# Patient Record
Sex: MALE | Employment: OTHER | URBAN - METROPOLITAN AREA
[De-identification: names, ages, dates, MRNs, and addresses within clinical notes are randomized per-mention and may not be internally consistent; named-entity substitution may affect disease eponyms.]

---

## 2017-12-07 ENCOUNTER — OFFICE VISIT (OUTPATIENT)
Dept: OPHTHALMOLOGY | Facility: CLINIC | Age: 57
End: 2017-12-07
Attending: OPHTHALMOLOGY
Payer: COMMERCIAL

## 2017-12-07 DIAGNOSIS — H53.10 SUBJECTIVE VISUAL DISTURBANCE: Primary | ICD-10-CM

## 2017-12-07 DIAGNOSIS — H53.10 SUBJECTIVE VISUAL DISTURBANCE: ICD-10-CM

## 2017-12-07 DIAGNOSIS — H50.22 HYPERTROPIA OF LEFT EYE: Primary | ICD-10-CM

## 2017-12-07 PROCEDURE — 99215 OFFICE O/P EST HI 40 MIN: CPT | Mod: 25,ZF

## 2017-12-07 PROCEDURE — 92060 SENSORIMOTOR EXAMINATION: CPT | Mod: ZF | Performed by: OPHTHALMOLOGY

## 2017-12-07 ASSESSMENT — TONOMETRY
OS_IOP_MMHG: 12
IOP_METHOD: ICARE
OD_IOP_MMHG: 11

## 2017-12-07 ASSESSMENT — VISUAL ACUITY
METHOD: SNELLEN - LINEAR
OD_CC: 20/25
CORRECTION_TYPE: GLASSES
OS_CC: 20/30

## 2017-12-07 ASSESSMENT — REFRACTION_WEARINGRX
OD_VBASE: UP
OS_SPHERE: -3.75
OD_ADD: +2.25
OD_SPHERE: -3.75
OS_ADD: +2.25
OS_AXIS: 038
OS_CYLINDER: +0.75
OS_SPHERE: -4.75
OD_HPRISM: 3 BI
OD_CYLINDER: +1.00
OD_SPHERE: -5.00
OD_AXIS: 133
OD_ADD: +2.25
OD_CYLINDER: +0.75
OS_CYLINDER: +0.75
OS_AXIS: 044
OD_AXIS: 138
OS_VBASE: DOWN
OS_ADD: +2.25

## 2017-12-07 ASSESSMENT — EXTERNAL EXAM - LEFT EYE: OS_EXAM: MRD 3

## 2017-12-07 ASSESSMENT — SLIT LAMP EXAM - LIDS
COMMENTS: NORMAL
COMMENTS: NORMAL

## 2017-12-07 ASSESSMENT — CONF VISUAL FIELD
OD_NORMAL: 1
OS_NORMAL: 1

## 2017-12-07 ASSESSMENT — EXTERNAL EXAM - RIGHT EYE: OD_EXAM: MRD 5

## 2017-12-07 ASSESSMENT — CUP TO DISC RATIO
OS_RATIO: 0.4
OD_RATIO: 0.4

## 2017-12-07 NOTE — NURSING NOTE
Chief Complaints and History of Present Illnesses   Patient presents with     Neurologic Problem     diplopia     HPI    Symptoms:              Comments:  Dane is a 57 year old male presenting with a history of:    1. Diplopia  Sudden onset diplopia after Glaucoma surgery (Ahmed procedure) June 26/2017. Done by Dr. Barreto.   Constant oblique diplopia, mostly vertical   Incorporated prisms. 14.5 pd OU.   No change in misalignment since onset in June.     Benedicto Granadomaayaka CO 1:44 PM December 7, 2017

## 2017-12-07 NOTE — MR AVS SNAPSHOT
After Visit Summary   12/7/2017    Dane Edward    MRN: [id number]           Patient Information     Date Of Birth          1960        Visit Information        Provider Department      12/7/2017 1:30 PM Dm Catherine MD [facility]        Today's Diagnoses     Hypertropia of left eye    -  1    Subjective visual disturbance           Follow-ups after your visit        Follow-up notes from your care team     Return for adult strabismus - kashif.      Your next 10 appointments already scheduled     Feb 07, 2018  1:30 PM CST   New Adult Strabismus with Shelton Baez MD   Eye Clinic ([facility])    [address]  [address]  [address]  [facility] [address] [phone]              Who to contact     Please call your clinic at [phone] to:    Ask questions about your health    Make or cancel appointments    Discuss your medicines    Learn about your test results    Speak to your doctor   If you have compliments or concerns about an experience at your clinic, or if you wish to file a complaint, please contact [facility] Patient Relations at [phone] or email us at [email or web address]         Additional Information About Your Visit        [employer] Information     [employer] gives you secure access to your electronic health record. If you see a primary care provider, you can also send messages to your care team and make appointments. If you have questions, please call your primary care clinic.  If you do not have a primary care provider, please call [phone] and they will assist you.      Ker[employer] is an electronic gateway that provides easy, online access to your medical records. With Ker[employer], you can request a clinic appointment, read your test results, renew a prescription or communicate with your care team.     To access your existing account, please contact your [facility]  Physicians Clinic or call [phone] for assistance.        Care EveryWhere ID     This is your Care EveryWhere ID. This could be used by other organizations to access your [facility] medical records  [id number]         Blood Pressure from Last 3 Encounters:   No data found for BP    Weight from Last 3 Encounters:   No data found for Wt              We Performed the Following     DILATED FUNDUS EXAM     IOP Measurement     Sensorimotor        Primary Care Provider Office Phone # Fax #    Kj Gonzalez [phone]       [facility] [address]  [facility] [address]        Equal Access to Services     [facility]: Hadii aad ku hadasho Soomaali, waaxda luqadaha, qaybta kaalmada adeegyada, josse colin . So [facility] [phone].    ATENCIÓN: Si habla español, tiene a jaramillo disposición servicios gratuitos de asistencia lingüística. Ll[facility] Healthcare [phone].    We comply with applicable federal civil rights laws and [place] laws. We do not discriminate on the basis of race, color, national origin, age, disability, sex, sexual orientation, or gender identity.            Thank you!     Thank you for choosing EYE CLINIC  for your care. Our goal is always to provide you with excellent care. Hearing back from our patients is one way we can continue to improve our services. Please take a few minutes to complete the written survey that you may receive in the mail after your visit with us. Thank you!             Your Updated Medication List - Protect others around you: Learn how to safely use, store and throw away your medicines at www.disposemymeds.org.      Notice  As of 12/7/2017  2:28 PM    You have not been prescribed any medications.

## 2017-12-07 NOTE — PROGRESS NOTES
Assessment & Plan     Dane Edward is a 57 year old male with the following diagnoses:   1. Hypertropia of left eye    2. Subjective visual disturbance       He had intraocular pressure around 50 both eyes secondary to topical corticosteroids from iritis.  He underwent glaucoma surgery on 6/26/17 on the RIGHT eye and this was fine. He had an ahmed 2 weeks later on the LEFT eye.  He had a numbing shot for both of them.  Postop day 1 for the LEFT eye, his eye was quite red and the eyelids were swollen.  The eye was not proptotic.  No CT was done.  He did not have a canthotomy or cantholysis.  Ever since POD1 his LEFT eye has been very hypertropic.  He was placed in prism glasses about a month ago.  The current prism helps his double vision.      He has a fairly comitant left hypertropia and is doing extremely well in prisms.  It is not clear whether this is myotoxicity from the retrobulbar or due to the Ahmed itself.  Recommend observation x 2 months and then see Shelton Baez MD.  If stable at that time then consider strabismus surgery.               Attending Physician Attestation:  Complete documentation of historical and exam elements from today's encounter can be found in the full encounter summary report (not reduplicated in this progress note).  I personally obtained the chief complaint(s) and history of present illness.  I confirmed and edited as necessary the review of systems, past medical/surgical history, family history, social history, and examination findings as documented by others; and I examined the patient myself.  I personally reviewed the relevant tests, images, and reports as documented above.  I formulated and edited as necessary the assessment and plan and discussed the findings and management plan with the patient and family. - Dm Catherine MD

## 2018-02-04 DIAGNOSIS — H53.2 DOUBLE VISION: Primary | ICD-10-CM

## 2018-02-07 ENCOUNTER — OFFICE VISIT (OUTPATIENT)
Dept: OPHTHALMOLOGY | Facility: CLINIC | Age: 58
End: 2018-02-07
Attending: OPHTHALMOLOGY
Payer: COMMERCIAL

## 2018-02-07 DIAGNOSIS — H53.2 DOUBLE VISION: ICD-10-CM

## 2018-02-07 DIAGNOSIS — H50.22 HYPERTROPIA OF LEFT EYE: Primary | ICD-10-CM

## 2018-02-07 PROCEDURE — 92060 SENSORIMOTOR EXAMINATION: CPT | Mod: ZF | Performed by: OPHTHALMOLOGY

## 2018-02-07 ASSESSMENT — VISUAL ACUITY
OD_CC: 20/25
OD_CC: 20/20
OS_CC: 20/25
CORRECTION_TYPE: GLASSES
OD_CC+: -2
OS_CC: 20/30
OS_CC+: -2
METHOD: SNELLEN - LINEAR

## 2018-02-07 ASSESSMENT — TONOMETRY
OS_IOP_MMHG: 9
OD_IOP_MMHG: 7
IOP_METHOD: ICARE

## 2018-02-07 ASSESSMENT — REFRACTION_WEARINGRX
OD_ADD: +2.25
OS_ADD: +2.25
OS_AXIS: 044
OD_CYLINDER: +1.00
OD_HPRISM: 3 BI
OD_ADD: +2.25
OS_SPHERE: -4.75
OS_ADD: +2.25
OS_CYLINDER: +0.75
OD_SPHERE: -5.00
OD_VBASE: UP
OS_CYLINDER: +0.75
OD_CYLINDER: +0.75
OS_VBASE: DOWN
OD_SPHERE: -3.75
OD_AXIS: 138
OS_SPHERE: -3.75
OS_AXIS: 038
OD_AXIS: 133

## 2018-02-07 ASSESSMENT — SLIT LAMP EXAM - LIDS
COMMENTS: NORMAL
COMMENTS: NORMAL

## 2018-02-07 ASSESSMENT — CONF VISUAL FIELD
OD_NORMAL: 1
OS_NORMAL: 1

## 2018-02-07 ASSESSMENT — EXTERNAL EXAM - LEFT EYE: OS_EXAM: MRD 3

## 2018-02-07 ASSESSMENT — CUP TO DISC RATIO
OD_RATIO: 0.4
OS_RATIO: 0.4

## 2018-02-07 ASSESSMENT — EXTERNAL EXAM - RIGHT EYE: OD_EXAM: MRD 5

## 2018-02-07 NOTE — MR AVS SNAPSHOT
After Visit Summary   2/7/2018    Dane Edward    MRN: [id number]           Patient Information     Date Of Birth          1960        Visit Information        Provider Department      2/7/2018 1:30 PM Shelton Baez MD [facility] Clinic        Today's Diagnoses     Hypertropia of left eye    -  1    Double vision           Follow-ups after your visit        Your next 10 appointments already scheduled     Feb 19, 2018   Procedure with Shelton Baez MD   [facility] ([email or web address] and [facility] Center)    [address] [place] [address]  5th [facility] [address] [phone]           Located in the Clinics and Surgery Center at [address] [place] [address].   parking is very convenient and highly recommended.  is a $6 flat rate fee.  Both  and self parkers should enter the main arrival plaza from [facility]; parking attendants will direct you based on your parking preference.            Feb 28, 2018  2:00 PM CST   Post-Op with Shelton Baez MD   Eye Clinic ([facility])    [address]  [facility] [id number] [phone]              Who to contact     Please call your clinic at [phone] to:    Ask questions about your health    Make or cancel appointments    Discuss your medicines    Learn about your test results    Speak to your doctor            Additional Information About Your Visit        MyChart Information     [employer] gives you secure access to your electronic health record. If you see a primary care provider, you can also send messages to your care team and make appointments. If you have questions, please call your primary care clinic.  If you do not have a primary care provider, please call [phone] and they will assist you.      [employer] is an electronic gateway that provides easy, online access to your medical records. With  [employer], you can request a clinic appointment, read your test results, renew a prescription or communicate with your care team.     To access your existing account, please contact your [facility] Clinic or call [phone] for assistance.        Care EveryWhere ID     This is your Care EveryWhere ID. This could be used by other organizations to access your [facility] medical records  [id number]         Blood Pressure from Last 3 Encounters:   No data found for BP    Weight from Last 3 Encounters:   No data found for Wt              We Performed the Following     IOP Measurement     Estela-Operative Worksheet (Peds)     Sensorimotor        Primary Care Provider Office Phone # Fax #    Kj Gonzalez [phone]       [facility] [address] [facility] [address]  [facility] [address]        Equal Access to Services     [facility] : Hadii bala rojaso Soaliya, waaxda luqadaha, qaybta kaalmada adeegyada, josse colin . So [facility] [phone].    ATENCIÓN: Si habla español, tiene a jaramillo disposición servicios gratuitos de asistencia lingüística. Llame al [phone].    We comply with applicable federal civil rights laws and [place] laws. We do not discriminate on the basis of race, color, national origin, age, disability, sex, sexual orientation, or gender identity.            Thank you!     Thank you for choosing EYE CLINIC  for your care. Our goal is always to provide you with excellent care. Hearing back from our patients is one way we can continue to improve our services. Please take a few minutes to complete the written survey that you may receive in the mail after your visit with us. Thank you!             Your Updated Medication List - Protect others around you: Learn how to safely use, store and throw away your medicines at www.disposemymeds.org.      Notice  As of 2/7/2018 11:59 PM    You have not been prescribed any medications.

## 2018-02-07 NOTE — PROGRESS NOTES
1. 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 Binocular diplopia following 2nd surgery of sequential Ahmed valve surgery ( in the right eye first then in the left eye on July 10, 2017)  3. Status post uncomplicated cataract extraction in both eyes September 2017    Sequential glaucoma surgery by Dr. Barreto  June 26- right eye.- Ahmed valve  No diplopia    July 10-left eye. Immediate diplopia- Ahmed valve.  Patient reports he had massive periocular swelling at the time of the surgery.  Underwent retrobulbar block for procedure.  Cataract extraction in the right eye September 2017  Cataract extraction in the left eye September 2017    Patient highly interested in strabismus surgery to allow him single binocular vision with or without prism glasses.  Mechanism of strabismus remains unclear at this time- restrictive left hypertropia from Ahmed valve on the left eye restricting left superior rectus versus paresis of left inferior rectus secondary to retrobulbar block.  Forced ductions is the best means to distinguish these two.  This is best performed under general anesthesia.    We discussed in detail the risks, benefits, and alternatives of eye muscle correction surgery including the very rare risk of death or serious morbidity from a general anesthesia complication and the rare risk of severe vision loss in the operative eye(s) secondary to retinal detachment or endophthalmitis.  We discussed more likely sub-optimal outcomes including the unanticipated need for additional strabismus surgery.  Finally the patient was aware that prisms glasses may be required to optimize single binocular vision following surgery.    After a thorough discussion of these risks, the patient decided to proceed with strabismus surgery.  The surgical plan is as follows:     1. Forced ductions testing in the left eye    2. If no restriction of left superior rectus then proceed with left inferior rectus  resection on adjustable suture    3. Possible right inferior rectus recession on adjustable suture    4. If left superior rectus is restricted then will abort strabismus surgery as we will need to revise Ahmed valve before or simultaneous with strabismus surgery.    Follow-up 1 week after strabismus surgery.         Complete documentation of historical and exam elements from today's encounter can be found in the full encounter summary report (not reduplicated in this progress note).  I personally obtained the chief complaint(s) and history of present illness.  I confirmed and edited as necessary the review of systems, past medical/surgical history, family history, social history, and examination findings as documented by others; and I examined the patient myself.  I personally reviewed the relevant tests, images, and reports as documented above.  I formulated and edited as necessary the assessment and plan and discussed the findings and management plan with the patient and family   Shelton Baez MD

## 2018-02-07 NOTE — NURSING NOTE
Chief Complaints and History of Present Illnesses   Patient presents with     Neurologic Problem     diplopia f/u      HPI    Symptoms:              Comments:   Dane Edward is a 57 year old male with the following diagnoses:   1. Hypertropia of left eye   2. Subjective visual disturbance     Referred by Dr. Catherine. Interested in strabismus surgery.   Has incorporated prisms.   Date tentatively set for the 19 th of February.     Benedicto Granadomaayaka CO 1:23 PM February 7, 2018

## 2018-02-07 NOTE — LETTER
2018    RE: Dane Edward  : 1960  MRN: [id number]    Dear Dr. Barreto:    Thank you for referring your patient, Dane Edward, to my adult strabismus clinic recently.  After a thorough history and examination, I came to the following conclusions:     1. 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 Binocular diplopia following 2nd surgery of sequential Ahmed valve surgery ( in the right eye first then in the left eye on July 10, 2017)  3. Status post uncomplicated cataract extraction in both eyes 2017    Sequential glaucoma surgery by Dr. Barreto - right eye.- Ahmed valve  No diplopia,    July 10-left eye. Immediate diplopia- Ahmed valve.  Patient reports he had massive periocular swelling at the time of the surgery.  Underwent retrobulbar block for procedure.  Cataract extraction in the right eye 2017  Cataract extraction in the left eye 2017    Patient highly interested in strabismus surgery to allow him single binocular vision with or without prism glasses.  Mechanism of strabismus remains unclear at this time- restrictive left hypertropia from Ahmed valve on the left eye restricting left superior rectus versus paresis of left inferior rectus secondary to retrobulbar block.  Forced ductions is the best means to distinguish these two.  This is best performed under general anesthesia.    We discussed in detail the risks, benefits, and alternatives of eye muscle correction surgery including the very rare risk of death or serious morbidity from a general anesthesia complication and the rare risk of severe vision loss in the operative eye(s) secondary to retinal detachment or endophthalmitis.  We discussed more likely sub-optimal outcomes including the unanticipated need for additional strabismus surgery.  Finally the patient was aware that prisms glasses may be required to optimize single binocular vision following  surgery.    After a thorough discussion of these risks, the patient decided to proceed with strabismus surgery.  The surgical plan is as follows:  1. Forced ductions testing in the left eye   2. If no restriction of left superior rectus then proceed with left inferior rectus resection on adjustable suture   3. Possible right inferior rectus recession on adjustable suture   4. If left superior rectus is restricted then will abort strabismus surgery as we will need to revise Ahmed valve before or simultaneous with strabismus surgery.    Follow-up 1 week after strabismus surgery.      Again, thank you for trusting me with the care of your patient.  For further exam details, please feel free to contact our office for additional records.  If you wish to contact me regarding this patient please email me at [email or web address] or give my clinic a call to arrange a phone conversation.    Sincerely,    Shelton Baez MD  , Neuro-Ophthalmology and Adult Strabismus  Department of Ophthalmology and Visual Neurosciences  [facility]    cc: Kj Bustos, DO Dm Catherine MD    DX: hypertropia, Ahmed valve associated strabismus

## 2018-02-14 RX ORDER — AMLODIPINE BESYLATE 5 MG/1
TABLET ORAL
COMMUNITY
Start: 2017-09-28

## 2018-02-14 RX ORDER — MULTIPLE VITAMINS W/ MINERALS TAB 9MG-400MCG
1 TAB ORAL DAILY
COMMUNITY

## 2018-02-16 ENCOUNTER — ANESTHESIA EVENT (OUTPATIENT)
Dept: SURGERY | Facility: AMBULATORY SURGERY CENTER | Age: 58
End: 2018-02-16

## 2018-02-19 ENCOUNTER — SURGERY (OUTPATIENT)
Age: 58
End: 2018-02-19

## 2018-02-19 ENCOUNTER — HOSPITAL ENCOUNTER (OUTPATIENT)
Facility: AMBULATORY SURGERY CENTER | Age: 58
End: 2018-02-19
Attending: OPHTHALMOLOGY
Payer: COMMERCIAL

## 2018-02-19 ENCOUNTER — ANESTHESIA (OUTPATIENT)
Dept: SURGERY | Facility: AMBULATORY SURGERY CENTER | Age: 58
End: 2018-02-19

## 2018-02-19 VITALS
OXYGEN SATURATION: 95 % | HEART RATE: 81 BPM | RESPIRATION RATE: 15 BRPM | TEMPERATURE: 98.4 F | DIASTOLIC BLOOD PRESSURE: 93 MMHG | BODY MASS INDEX: 22.22 KG/M2 | WEIGHT: 150 LBS | HEIGHT: 69 IN | SYSTOLIC BLOOD PRESSURE: 136 MMHG

## 2018-02-19 DIAGNOSIS — Z98.890 POSTOPERATIVE EYE STATE: Primary | ICD-10-CM

## 2018-02-19 RX ORDER — FENTANYL CITRATE 50 UG/ML
INJECTION, SOLUTION INTRAMUSCULAR; INTRAVENOUS PRN
Status: DISCONTINUED | OUTPATIENT
Start: 2018-02-19 | End: 2018-02-19

## 2018-02-19 RX ORDER — LIDOCAINE 40 MG/G
CREAM TOPICAL
Status: DISCONTINUED | OUTPATIENT
Start: 2018-02-19 | End: 2018-02-20 | Stop reason: HOSPADM

## 2018-02-19 RX ORDER — TETRACAINE HYDROCHLORIDE 5 MG/ML
1-2 SOLUTION OPHTHALMIC
Status: DISCONTINUED | OUTPATIENT
Start: 2018-02-19 | End: 2018-02-20 | Stop reason: HOSPADM

## 2018-02-19 RX ORDER — LIDOCAINE HYDROCHLORIDE 20 MG/ML
INJECTION, SOLUTION INFILTRATION; PERINEURAL PRN
Status: DISCONTINUED | OUTPATIENT
Start: 2018-02-19 | End: 2018-02-19

## 2018-02-19 RX ORDER — ONDANSETRON 2 MG/ML
4 INJECTION INTRAMUSCULAR; INTRAVENOUS EVERY 30 MIN PRN
Status: DISCONTINUED | OUTPATIENT
Start: 2018-02-19 | End: 2018-02-20 | Stop reason: HOSPADM

## 2018-02-19 RX ORDER — ONDANSETRON 4 MG/1
4 TABLET, ORALLY DISINTEGRATING ORAL EVERY 30 MIN PRN
Status: DISCONTINUED | OUTPATIENT
Start: 2018-02-19 | End: 2018-02-20 | Stop reason: HOSPADM

## 2018-02-19 RX ORDER — GLYCOPYRROLATE 0.2 MG/ML
INJECTION, SOLUTION INTRAMUSCULAR; INTRAVENOUS PRN
Status: DISCONTINUED | OUTPATIENT
Start: 2018-02-19 | End: 2018-02-19

## 2018-02-19 RX ORDER — ONDANSETRON 2 MG/ML
INJECTION INTRAMUSCULAR; INTRAVENOUS PRN
Status: DISCONTINUED | OUTPATIENT
Start: 2018-02-19 | End: 2018-02-19

## 2018-02-19 RX ORDER — NALOXONE HYDROCHLORIDE 0.4 MG/ML
.1-.4 INJECTION, SOLUTION INTRAMUSCULAR; INTRAVENOUS; SUBCUTANEOUS
Status: DISCONTINUED | OUTPATIENT
Start: 2018-02-19 | End: 2018-02-20 | Stop reason: HOSPADM

## 2018-02-19 RX ORDER — OXYMETAZOLINE HYDROCHLORIDE 0.05 G/100ML
SPRAY NASAL PRN
Status: DISCONTINUED | OUTPATIENT
Start: 2018-02-19 | End: 2018-02-19 | Stop reason: HOSPADM

## 2018-02-19 RX ORDER — FENTANYL CITRATE 50 UG/ML
25-50 INJECTION, SOLUTION INTRAMUSCULAR; INTRAVENOUS EVERY 5 MIN PRN
Status: DISCONTINUED | OUTPATIENT
Start: 2018-02-19 | End: 2018-02-20 | Stop reason: HOSPADM

## 2018-02-19 RX ORDER — PROPOFOL 10 MG/ML
INJECTION, EMULSION INTRAVENOUS PRN
Status: DISCONTINUED | OUTPATIENT
Start: 2018-02-19 | End: 2018-02-19

## 2018-02-19 RX ORDER — SODIUM CHLORIDE, SODIUM LACTATE, POTASSIUM CHLORIDE, CALCIUM CHLORIDE 600; 310; 30; 20 MG/100ML; MG/100ML; MG/100ML; MG/100ML
INJECTION, SOLUTION INTRAVENOUS CONTINUOUS
Status: DISCONTINUED | OUTPATIENT
Start: 2018-02-19 | End: 2018-02-20 | Stop reason: HOSPADM

## 2018-02-19 RX ORDER — DEXAMETHASONE SODIUM PHOSPHATE 4 MG/ML
INJECTION, SOLUTION INTRA-ARTICULAR; INTRALESIONAL; INTRAMUSCULAR; INTRAVENOUS; SOFT TISSUE PRN
Status: DISCONTINUED | OUTPATIENT
Start: 2018-02-19 | End: 2018-02-19

## 2018-02-19 RX ORDER — ACETAMINOPHEN 325 MG/1
975 TABLET ORAL ONCE
Status: COMPLETED | OUTPATIENT
Start: 2018-02-19 | End: 2018-02-19

## 2018-02-19 RX ORDER — PREDNISOLONE ACETATE 10 MG/ML
1 SUSPENSION/ DROPS OPHTHALMIC 4 TIMES DAILY
Qty: 1 BOTTLE | Refills: 0 | Status: SHIPPED | OUTPATIENT
Start: 2018-02-19

## 2018-02-19 RX ORDER — BALANCED SALT SOLUTION 6.4; .75; .48; .3; 3.9; 1.7 MG/ML; MG/ML; MG/ML; MG/ML; MG/ML; MG/ML
SOLUTION OPHTHALMIC PRN
Status: DISCONTINUED | OUTPATIENT
Start: 2018-02-19 | End: 2018-02-19 | Stop reason: HOSPADM

## 2018-02-19 RX ORDER — PROPOFOL 10 MG/ML
INJECTION, EMULSION INTRAVENOUS CONTINUOUS PRN
Status: DISCONTINUED | OUTPATIENT
Start: 2018-02-19 | End: 2018-02-19

## 2018-02-19 RX ADMIN — ONDANSETRON 4 MG: 2 INJECTION INTRAMUSCULAR; INTRAVENOUS at 07:40

## 2018-02-19 RX ADMIN — ACETAMINOPHEN 975 MG: 325 TABLET ORAL at 06:35

## 2018-02-19 RX ADMIN — PROPOFOL 50 MG: 10 INJECTION, EMULSION INTRAVENOUS at 07:34

## 2018-02-19 RX ADMIN — BALANCED SALT SOLUTION 1 APPLICATOR: 6.4; .75; .48; .3; 3.9; 1.7 SOLUTION OPHTHALMIC at 07:49

## 2018-02-19 RX ADMIN — LIDOCAINE HYDROCHLORIDE 100 MG: 20 INJECTION, SOLUTION INFILTRATION; PERINEURAL at 07:32

## 2018-02-19 RX ADMIN — DEXAMETHASONE SODIUM PHOSPHATE 4 MG: 4 INJECTION, SOLUTION INTRA-ARTICULAR; INTRALESIONAL; INTRAMUSCULAR; INTRAVENOUS; SOFT TISSUE at 07:40

## 2018-02-19 RX ADMIN — ONDANSETRON 4 MG: 2 INJECTION INTRAMUSCULAR; INTRAVENOUS at 08:10

## 2018-02-19 RX ADMIN — GLYCOPYRROLATE 0.2 MG: 0.2 INJECTION, SOLUTION INTRAMUSCULAR; INTRAVENOUS at 07:29

## 2018-02-19 RX ADMIN — PROPOFOL 50 MG: 10 INJECTION, EMULSION INTRAVENOUS at 07:33

## 2018-02-19 RX ADMIN — FENTANYL CITRATE 50 MCG: 50 INJECTION, SOLUTION INTRAMUSCULAR; INTRAVENOUS at 07:39

## 2018-02-19 RX ADMIN — SODIUM CHLORIDE, SODIUM LACTATE, POTASSIUM CHLORIDE, CALCIUM CHLORIDE: 600; 310; 30; 20 INJECTION, SOLUTION INTRAVENOUS at 06:36

## 2018-02-19 RX ADMIN — TETRACAINE HYDROCHLORIDE 1 DROP: 5 SOLUTION OPHTHALMIC at 08:57

## 2018-02-19 RX ADMIN — PROPOFOL 200 MG: 10 INJECTION, EMULSION INTRAVENOUS at 07:32

## 2018-02-19 RX ADMIN — PROPOFOL 200 MCG/KG/MIN: 10 INJECTION, EMULSION INTRAVENOUS at 07:33

## 2018-02-19 RX ADMIN — FENTANYL CITRATE 50 MCG: 50 INJECTION, SOLUTION INTRAMUSCULAR; INTRAVENOUS at 07:32

## 2018-02-19 RX ADMIN — OXYMETAZOLINE HYDROCHLORIDE 0.5 ML: 0.05 SPRAY NASAL at 07:40

## 2018-02-19 NOTE — ANESTHESIA PREPROCEDURE EVALUATION
Anesthesia Evaluation     .             ROS/MED HX    ENT/Pulmonary:  - neg pulmonary ROS     Neurologic:  - neg neurologic ROS     Cardiovascular:     (+) hypertension----. : . . . :. . Previous cardiac testing date:results:date: results:ECG reviewed date:2/14/18 results:EKG shows borderline ST seg elevation, likely early repolarization. SB at 59 BPM date: results:          METS/Exercise Tolerance:  >4 METS   Hematologic:         Musculoskeletal:  - neg musculoskeletal ROS       GI/Hepatic:  - neg GI/hepatic ROS       Renal/Genitourinary:  - ROS Renal section negative       Endo:  - neg endo ROS       Psychiatric:  - neg psychiatric ROS       Infectious Disease:  - neg infectious disease ROS       Malignancy:      - no malignancy   Other:    - neg other ROS                 Physical Exam  Normal systems: dental    Airway   Mallampati: II  TM distance: >3 FB  Neck ROM: full    Dental     Cardiovascular   Rhythm and rate: regular and normal      Pulmonary    breath sounds clear to auscultation                    Anesthesia Plan      History & Physical Review  History and physical reviewed and following examination; no interval change.    ASA Status:  2 .    NPO Status:  > 8 hours    Plan for General with Propofol induction. Maintenance will be TIVA.    PONV prophylaxis:  Ondansetron (or other 5HT-3) and Dexamethasone or Solumedrol       Postoperative Care  Postoperative pain management:  IV analgesics and Oral pain medications.      Consents  Anesthetic plan, risks, benefits and alternatives discussed with:  Patient..                          .

## 2018-02-19 NOTE — OP NOTE
ATTENDING SURGEON:  Shelton Baez MD    DATE OF PROCEDURE:  February 19, 2018    FIRST SURGICAL ASSISTANT:  ZITA TIWARI MD     ANESTHESIA:  General anesthesia    PREOPERATIVE DIAGNOSIS (ES):  1. 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 Forced ductions testing in both eyes   2. Left inferior rectus resection 5.5 mm   3. Right inferior rectus recession 1 mm on adjustable suture- recessed an additional approximate 4 mm during adjustment postoperatively.    INDICATIONS FOR PROCEDURE:    The patient is a pleasant 57 year old male with a history of chronic HLA-B27 associated uveitis.  He developed bilateral glaucoma requiring sequential (right then left) Ahmed tube shunt placement.  The left eye was performed in July 2017 by Dr. Barreto.  A retrobulbar injection was performed at the time of surgery.   Immediately afterwards he had an obvious left hypertropia with binocular diplopia.  The patient was observed for spontaneous resolution but this did not occur.  He was highly motivated for strabismus surgery to reduce dependency on prism glasses and allow single binocular vision with or without prism glasses.      I warned the patient about the risks, benefits, and alternatives of eye muscle surgery including a relatively small risk for severe infection, retinal detachment, and permanent vision loss of the eye.  I also discussed the possibility of postoperative double vision.  I discussed the possibility that due to postoperative double vision or a postoperative recurrent strabismus, the patient may require additional strabismus procedures in the future.  Understanding these risks, the patient decided to proceed with strabismus surgery.  The patient was aware that given the unclear mechanism of hypertropia (anesthetic injection complication causing left inferior rectus paresis versus scarring and restriction of the left  "superior rectus from the Ahmed implant) the surgery would start with forced ductions and may be aborted if there is an indication of left superior rectus restriction.  Repair of restrictive strabismus would require a combined surgical plan with glaucoma and may require removal of the Ahmed implant.     DESCRIPTION OF PROCEDURE:    After the risks, benefits, and alternatives of eye muscle surgery were discussed with the patient and the patient's questions were answered both in clinic and on the day of surgery, written informed consent was obtained and witnessed in the preoperative holding area on the day of surgery.  The word \"yes\" was written over both eyes indicating that the patient consented to eye muscle correction in both eyes.  An intravenous line was placed and light intravenous sedation was given.  The patient was transported to the operating room where after appropriate monitors were placed, the patient underwent induction of general anesthesia without complication.      Both eyes were prepped and draped in the usual sterile fashion for ophthalmic surgery and a lid speculum was placed in the left eye.  Forced duction testing in this eye revealed no restriction to infraduction.  A trapezoidal inferonasal conjunctival flap was created using conjunctival forceps and Anna scissors.  This was reflected backwards to expose the left inferior rectus muscle which was isolated using a [place] muscle hook.  The muscle was freed from Tenon's capsule with blunt dissection using a Anna scissors and cotton swab.  A double armed 6-0 Vicryl suture was then woven across the width of the muscle at a point measured to be 5.5 mm posterior to the insertion and tied to the edges.  A hemostat straight clamp was then clamped perpendicular to the muscle just anterior to the suture and the distal 5 mm muscle segment was excised with a Anna scissors and 0.3 forceps.  Both arms of the 6-0 Vicryl suture were then passed " partial thickness through the sclera in a crossing swords technique at the physiologic insertion site.  At this point, the ends of the suture were tied together tightly advancing the muscle and completing a resection effect of 5.5 mm.  The needles were cut off and the ends were cut short.  The conjunctival flap was then re-opposed in the surgical bed and held in place using two 6-0 plain gut sutures.  The lid speculum was removed from the operative eye.     A lid speculum was placed in the right eye.  Forced duction testing in this eye revealed no restriction.  A trapezoidal inferonasal conjunctival flap was created using conjunctival forceps and Anna scissors.  This was reflected backwards to expose the right inferior rectus muscle which was isolated using a [place] muscle hook.  The muscle was freed from Tenon's capsule with blunt dissection using a Anna scissors and cotton swab.  A double armed 6-0 Vicryl suture was then woven across the width of the muscle near it's insertion of the globe and tied to the edges.  The muscle was disinserted from the globe using Anna scissors.  Both arms of the 6-0 Vicryl suture were then passed partial thickness through the sclera at the physiologic muscle insertion in a  crossing swords technique.   At this point, a 6-0 undyed Vicryl suture with needle removed was used to create a sliding  noose  knot allowing for post-operative adjustment.  A 6-0 Vicryl suture on a spatulated needle was then used to make a partial-thickness scleral bite adjacent to the operative muscle physiologic insertion to serve as a traction suture facilitating the adjustment process.  The needles were then cut off of the double armed 6-0 suture on which the extraocular muscle was recessed.  The operative muscle was then recessed 1 mm from its original insertion as measured with a caliper.  Viscoelastic agent was then used to provide lubrication to the noose knot so that it would slide freely.   The trapezoidal conjunctival flap was reopposed in the surgical bed and held in place with 6-0 plain gut suture using one permanent suture and one temporary suture (which would be made permanent following adjustment post-operartively).The lid speculum was removed from the operative eye.     TobraDex ointment was placed in both eyes.  The patient was awakened from general anesthesia without complication and discharged to the recovery room in stable condition.       SPECIMENS REMOVED:  None.      ESTIMATED BLOOD LOSS:  1 mL or less.    INTRAOPERATIVE FLUIDS:  As per anesthesia records.      SPONGE/INSTRUMENT/NEEDLE COUNTS:  All sponge, instrument, and needle counts were correct times two.    CONDITION ON DISCHARGE FROM OPERATING ROOM:  Stable.      COMPLICATIONS:  None.     I was present for the entire procedure    POST-OPERATIVE ADJUSTMENT    After awakening from anesthesia sufficiently to communicate clearly and fixate on a target consistent, the adjustment process began.  Tetracaine topical anesthetic was placed in both eyes and steri-strips holding down the temporary sutures were removed from the face.      Alignment measurements were obtained and showed a residual left hypertropia of 12 prism diopters in primary gaze fixating a distance target with prism free glasses.  The right inferior rectus was recessed an additional approximate 4 mm and repeat measurements showed no hypertropia in primary gaze up gaze and down gaze on alternate cover testing and single Mack barrie testing.    All sutures were then tied off and ends were cut short.  The adjustment process took an estimated 20 minutes to complete.

## 2018-02-19 NOTE — IP AVS SNAPSHOT
MRN:[id number]                      After Visit Summary   2/19/2018    Dane Edward    MRN: [id number]           Thank you!     Thank you for choosing [facility] for your care. Our goal is always to provide you with excellent care. Hearing back from our patients is one way we can continue to improve our services. Please take a few minutes to complete the written survey that you may receive in the mail after you visit with us. Thank you!        Patient Information     Date Of Birth          1960        About your hospital stay     You were admitted on:  February 19, 2018 [facility]    You were discharged on:  February 19, 2018       Who to Call     For medical emergencies, please call 911.  For non-urgent questions about your medical care, please call your primary care provider or clinic, [phone]  For questions related to your surgery, please call your surgery clinic        Attending Provider     Provider Specialty    Shelton Baez MD Ophthalmology       Primary Care Provider Office Phone # Fax #    Kj SOTO Do Akash [phone]      Your next 10 appointments already scheduled     Feb 28, 2018  2:00 PM CST   Post-Op with Shelton Baez MD   Eye Clinic ([facility])    [address] [id number] [phone]              Further instructions from your care team       Instructions for after your eye muscle surgery:    Instill 1 cm of Tobradex ophthalmic ointment in the operative eye(s) in 3 times per day until follow-up with Dr. Baez in about 1 week.  The ointment is expected to blur vision but is necessary.      You will also go home with a prescription for a steroid eye drop. Do NOT start this eye drop yet.  When you see Dr. Baez at your post-operative visit he will tell you if and when to start the  drops.    Avoid all eye pressure or trauma for 7 days.  No eye rubbing, straining, swimming, athletics, or outdoor activities in which dirt or foreign objects could enter the eye.      ok to take a bath and shower immediately but don t run shower water on face.      Tylenol or ibuprofen over the counter may be used for pain.  Pain can occasionally be moderate for 1 or 2 days after surgery.  If pain is severe or does not improve greatly with over the counter medications call our office.    Return for follow-up with Dr. Baez as already scheduled (generally about 1 week after surgery).  If you do not have an appointment already, please call Florin Melendrez at [phone] or our  at [phone] and arrange to follow-up in 1 week.    [facility]  Home Care Following Anesthesia  For 24 hours after surgery:  1. Get plenty of rest.  A responsible adult must stay with you for at least 24 hours after you leave the surgery center.  2. Do not drive or use heavy equipment.  If you have weakness or tingling, don't drive or use heavy equipment until this feeling goes away.   3. Do not drink alcohol.   4. Avoid strenuous or risky activities.  Ask for help when climbing stairs.  5. You may feel lightheaded.  IF so, sit for a few minutes before standing.  Have someone help you get up.   6. If you have nausea (feel sick to your stomach): Drink only clear liquids such as apple juice, ginger ale, broth or 7-Up.  Rest may also help.  Be sure to drink enough fluids.  Move to a regular diet as you feel able.   7. You may have a slight fever.  Call the doctor if your fever is over 100 F (37.7 C) (taken under the tongue) or lasts longer than 24 hours.  8. You may have a dry mouth, a sore throat, muscle aches or trouble sleeping. These should go away after 24 hours.  9. Do not make important or legal decisions.               Tips for taking pain medications  To get the best pain  relief possible, remember these points:    Take pain medications as directed, before pain becomes severe.    Pain medication can upset your stomach: taking it with food may help.    Constipation is a common side effect of pain medication. Drink plenty of  fluids.    Eat foods high in fiber. Take a stool softener if recommended by your doctor or pharmacist.    Do not drink alcohol, drive or operate machinery while taking pain medications.    Ask about other ways to control pain, such as with heat, ice or relaxation.    Tylenol/Acetaminophen Consumption  To help encourage the safe use of acetaminophen, the makers of TYLENOL  have lowered the maximum daily dose for single-ingredient Extra Strength TYLENOL  (acetaminophen) products sold in the [place] from 8 pills per day (4,000 mg) to 6 pills per day (3,000 mg). The dosing interval has also changed from 2 pills every 4-6 hours to 2 pills every 6 hours.    If you feel your pain relief is insufficient, you may take Tylenol/Acetaminophen in addition to your narcotic pain medication.     Be careful not to exceed 3,000 mg of Tylenol/Acetaminophen in a 24 hour period from all sources.    If you are taking extra strength Tylenol/acetaminophen (500 mg), the maximum dose is 6 tablets in 24 hours.    If you are taking regular strength acetaminophen (325 mg), the maximum dose is 9 tablets in 24 hours.    Call a doctor for any of the followin. Signs of infection (fever, growing tenderness at the surgery site, a large amount of drainage or bleeding, severe pain, foul-smelling drainage, redness, swelling).  2. It has been over 8 to 10 hours since surgery and you are still not able to urinate (pass water).  3. Headache for over 24 hours.  4. Numbness, tingling or weakness the day after surgery (if you had spinal anesthesia).  Your doctor is:       Dr. Shelton Baez, Ophthalmology: [phone]               Or dial [phone] and ask for the resident on call for:   "Ophthalmology  For emergency care, call the:  [facility]:  [phone] (TTY for hearing impaired: [phone])                Pending Results     No orders found from 2/17/2018 to 2/20/2018.            Admission Information     Date & Time Provider Department Dept. Phone    2/19/2018 Shelton Baez MD [facility] [phone]      Your Vitals Were     Blood Pressure Pulse Temperature Respirations Height Weight    151/100 96 98.1  F (36.7  C) (Oral) 16 1.753 m (5' 9\") 68 kg (150 lb)    Pulse Oximetry BMI (Body Mass Index)                98% 22.15 kg/m2          [place] Self Representation Document [employer] Information     RE2 gives you secure access to your electronic health record. If you see a primary care provider, you can also send messages to your care team and make appointments. If you have questions, please call your primary care clinic.  If you do not have a primary care provider, please call [phone] and they will assist you.      RE2 is an electronic gateway that provides easy, online access to your medical records. With RE2, you can request a clinic appointment, read your test results, renew a prescription or communicate with your care team.     To access your existing account, please contact your [facility] or call [phone] for assistance.        Care EveryWhere ID     This is your Care EveryWhere ID. This could be used by other organizations to access your [facility] medical records  [id number]        Equal Access to Services     MARCIN WAGNER : Hadii bala rojaso Soaliya, waaxda luqadaha, qaybta kaalmada adeegyada, wax parviz colin . So [facility] [phone].    ATENCIÓN: Si habla español, tiene a jaramillo disposición servicios gratuitos de asistencia lingüística. Llame al [phone].    We comply with applicable federal civil rights laws and [place] laws. We do not discriminate on the basis of race, color, " national origin, age, disability, sex, sexual orientation, or gender identity.               Review of your medicines      START taking        Dose / Directions    prednisoLONE acetate 1 % ophthalmic susp   Commonly known as:  PRED FORTE   Used for:  Postoperative eye state        Dose:  1 drop   Place 1 drop into both eyes 4 times daily   Quantity:  1 Bottle   Refills:  0         CONTINUE these medicines which have NOT CHANGED        Dose / Directions    amLODIPine 5 MG tablet   Commonly known as:  NORVASC        Refills:  0       Multi-vitamin Tabs tablet        Dose:  1 tablet   Take 1 tablet by mouth daily   Refills:  0            Where to get your medicines      These medications were sent to [facility] [address] [facility] [address] [place] [address] [address] [place] [address]    Hours:  TRANSPLANT PHONE NUMBER [phone] Phone:  [phone]     prednisoLONE acetate 1 % ophthalmic susp                Protect others around you: Learn how to safely use, store and throw away your medicines at www.disposemymeds.org.             Medication List: This is a list of all your medications and when to take them. Check marks below indicate your daily home schedule. Keep this list as a reference.      Medications           Morning Afternoon Evening Bedtime As Needed    amLODIPine 5 MG tablet   Commonly known as:  NORVASC                                Multi-vitamin Tabs tablet   Take 1 tablet by mouth daily                                prednisoLONE acetate 1 % ophthalmic susp   Commonly known as:  PRED FORTE   Place 1 drop into both eyes 4 times daily

## 2018-02-19 NOTE — BRIEF OP NOTE
Brief Operative Note    Pre-operative diagnosis: Stabismus   Post-operative diagnosis Same   Procedure: Procedure(s):  Bilateral Strabismus Repair,  Use of Adjustable Suture in Right Eye - Wound Class: I-Clean   Surgeon: Shelton Baez M.D.    Assistant(s): Mejia Loza M.D. - Resident    Estimated blood loss: Less than 1 mL   Specimens: None   Findings: As expected       Mejia Loza MD  PGY3, Dept of Ophthalmology  Pager [phone]

## 2018-02-19 NOTE — DISCHARGE INSTRUCTIONS
Instructions for after your eye muscle surgery:    Instill 1 cm of Tobradex ophthalmic ointment in the operative eye(s) in 3 times per day until follow-up with Dr. Baez in about 1 week.  The ointment is expected to blur vision but is necessary.      You will also go home with a prescription for a steroid eye drop. Do NOT start this eye drop yet.  When you see Dr. Baez at your post-operative visit he will tell you if and when to start the drops.    Avoid all eye pressure or trauma for 7 days.  No eye rubbing, straining, swimming, athletics, or outdoor activities in which dirt or foreign objects could enter the eye.      ok to take a bath and shower immediately but don t run shower water on face.      Tylenol or ibuprofen over the counter may be used for pain.  Pain can occasionally be moderate for 1 or 2 days after surgery.  If pain is severe or does not improve greatly with over the counter medications call our office.    Return for follow-up with Dr. Baez as already scheduled (generally about 1 week after surgery).  If you do not have an appointment already, please call Florin Melendrez at [phone] or our  at [phone] and arrange to follow-up in 1 week.    [facility]  Home Care Following Anesthesia  For 24 hours after surgery:  1. Get plenty of rest.  A responsible adult must stay with you for at least 24 hours after you leave the surgery center.  2. Do not drive or use heavy equipment.  If you have weakness or tingling, don't drive or use heavy equipment until this feeling goes away.   3. Do not drink alcohol.   4. Avoid strenuous or risky activities.  Ask for help when climbing stairs.  5. You may feel lightheaded.  IF so, sit for a few minutes before standing.  Have someone help you get up.   6. If you have nausea (feel sick to your stomach): Drink only clear liquids such as apple juice, ginger ale, broth or 7-Up.  Rest may also help.   Be sure to drink enough fluids.  Move to a regular diet as you feel able.   7. You may have a slight fever.  Call the doctor if your fever is over 100 F (37.7 C) (taken under the tongue) or lasts longer than 24 hours.  8. You may have a dry mouth, a sore throat, muscle aches or trouble sleeping. These should go away after 24 hours.  9. Do not make important or legal decisions.               Tips for taking pain medications  To get the best pain relief possible, remember these points:    Take pain medications as directed, before pain becomes severe.    Pain medication can upset your stomach: taking it with food may help.    Constipation is a common side effect of pain medication. Drink plenty of  fluids.    Eat foods high in fiber. Take a stool softener if recommended by your doctor or pharmacist.    Do not drink alcohol, drive or operate machinery while taking pain medications.    Ask about other ways to control pain, such as with heat, ice or relaxation.    Tylenol/Acetaminophen Consumption  To help encourage the safe use of acetaminophen, the makers of TYLENOL  have lowered the maximum daily dose for single-ingredient Extra Strength TYLENOL  (acetaminophen) products sold in the [place] from 8 pills per day (4,000 mg) to 6 pills per day (3,000 mg). The dosing interval has also changed from 2 pills every 4-6 hours to 2 pills every 6 hours.    If you feel your pain relief is insufficient, you may take Tylenol/Acetaminophen in addition to your narcotic pain medication.     Be careful not to exceed 3,000 mg of Tylenol/Acetaminophen in a 24 hour period from all sources.    If you are taking extra strength Tylenol/acetaminophen (500 mg), the maximum dose is 6 tablets in 24 hours.    If you are taking regular strength acetaminophen (325 mg), the maximum dose is 9 tablets in 24 hours.    Call a doctor for any of the followin. Signs of infection (fever, growing tenderness at the surgery site, a large amount of drainage or  bleeding, severe pain, foul-smelling drainage, redness, swelling).  2. It has been over 8 to 10 hours since surgery and you are still not able to urinate (pass water).  3. Headache for over 24 hours.  4. Numbness, tingling or weakness the day after surgery (if you had spinal anesthesia).  Your doctor is:       Dr. Shelton Baez, Ophthalmology: [phone]               Or dial [phone] and ask for the resident on call for:  Ophthalmology  For emergency care, call the:  [facility]:  [phone] (TTY for hearing impaired: [phone])

## 2018-02-19 NOTE — IP AVS SNAPSHOT
[facility]    [address] [place] [id number]    Phone:  [phone]    Fax:  [phone]                                       After Visit Summary   2/19/2018    Dane Edward    MRN: [id number]           After Visit Summary Signature Page     I have received my discharge instructions, and my questions have been answered. I have discussed any challenges I see with this plan with the nurse or doctor.    ..........................................................................................................................................  Patient/Patient Representative Signature      ..........................................................................................................................................  Patient Representative Print Name and Relationship to Patient    ..................................................               ................................................  Date                                            Time    ..........................................................................................................................................  Reviewed by Signature/Title    ...................................................              ..............................................  Date                                                            Time

## 2018-02-19 NOTE — ANESTHESIA POSTPROCEDURE EVALUATION
Patient: Dane Edward    Procedure(s):  Bilateral Strabismus Repair,  Use of Adjustable Suture in Right Eye - Wound Class: I-Clean    Diagnosis:Stabismus  Diagnosis Additional Information: No value filed.    Anesthesia Type:  General    Note:  Anesthesia Post Evaluation    Patient location during evaluation: PACU  Patient participation: Able to fully participate in evaluation  Level of consciousness: sleepy but conscious and responsive to verbal stimuli  Pain management: adequate  Airway patency: patent  Cardiovascular status: acceptable  Respiratory status: acceptable  Hydration status: acceptable     Anesthetic complications: None          Last vitals:  Vitals:    02/19/18 0925 02/19/18 0940 02/19/18 1000   BP: (!) 137/94 (!) 134/94 (!) 136/93   Pulse: 87 87 81   Resp: 16 15 15   Temp: 36.9  C (98.5  F) 36.8  C (98.2  F) 36.9  C (98.4  F)   SpO2: 95% 95% 95%         Electronically Signed By: Adeline Alex MD  February 19, 2018  11:37 AM

## 2018-02-19 NOTE — ANESTHESIA CARE TRANSFER NOTE
Patient: Dane Edward    Procedure(s):  Bilateral Strabismus Repair,  Use of Adjustable Suture in Right Eye - Wound Class: I-Clean    Diagnosis: Stabismus  Diagnosis Additional Information: No value filed.    Anesthesia Type:   General     Note:  Airway :Room Air  Patient transferred to:PACU  Comments: Uneventful transport to PACU; VSS; Report given to RN; Pt comfortable; IV patent: pt exchanging wellHandoff Report: Identifed the Patient, Identified the Reponsible Provider, Reviewed the pertinent medical history, Discussed the surgical course, Reviewed Intra-OP anesthesia mangement and issues during anesthesia, Set expectations for post-procedure period and Allowed opportunity for questions and acknowledgement of understanding      Vitals: (Last set prior to Anesthesia Care Transfer)    CRNA VITALS  2/19/2018 0817 - 2/19/2018 0851      2/19/2018             Pulse: 88    SpO2: 98 %    Resp Rate (observed): (!)  2    Resp Rate (set): 10                Electronically Signed By: ADDY Doll CRNA  February 19, 2018  8:51 AM

## 2018-02-22 ENCOUNTER — TELEPHONE (OUTPATIENT)
Dept: OPHTHALMOLOGY | Facility: CLINIC | Age: 58
End: 2018-02-22

## 2018-02-22 NOTE — TELEPHONE ENCOUNTER
Pain is mild, minimal. Easily managed  No sign of infection or swelling, no puss or discharge  Vision has been blurry with ointment but good otherwise.   Patient using ointment as directed (TID). Reminded patient to continue with ointment until gone and then begin eye drop (QID) when out of ointment.   Patient hasn't noticed any double vision  Reminded patient of post op appointment and provided him with direct line to call for any questions or concerns.

## 2018-02-28 ENCOUNTER — OFFICE VISIT (OUTPATIENT)
Dept: OPHTHALMOLOGY | Facility: CLINIC | Age: 58
End: 2018-02-28
Attending: OPHTHALMOLOGY
Payer: COMMERCIAL

## 2018-02-28 DIAGNOSIS — H50.22 HYPERTROPIA OF LEFT EYE: Primary | ICD-10-CM

## 2018-02-28 PROCEDURE — G0463 HOSPITAL OUTPT CLINIC VISIT: HCPCS | Mod: ZF | Performed by: TECHNICIAN/TECHNOLOGIST

## 2018-02-28 RX ORDER — SODIUM BICARBONATE 650 MG/1
1300 TABLET ORAL
COMMUNITY
Start: 2018-01-18

## 2018-02-28 RX ORDER — CALCITRIOL 0.25 UG/1
0.25 CAPSULE, LIQUID FILLED ORAL
COMMUNITY
Start: 2018-01-19

## 2018-02-28 RX ORDER — ACETAMINOPHEN 325 MG/1
650 TABLET ORAL
COMMUNITY

## 2018-02-28 RX ORDER — KETOROLAC TROMETHAMINE 5 MG/ML
SOLUTION OPHTHALMIC
COMMUNITY

## 2018-02-28 RX ORDER — FERROUS SULFATE 325(65) MG
325 TABLET, DELAYED RELEASE (ENTERIC COATED) ORAL
COMMUNITY
Start: 2017-08-09 | End: 2018-08-09

## 2018-02-28 RX ORDER — LOTEPREDNOL ETABONATE 5 MG/G
1 GEL OPHTHALMIC
COMMUNITY

## 2018-02-28 ASSESSMENT — VISUAL ACUITY
CORRECTION_TYPE: GLASSES
OS_CC: 20/40
METHOD: SNELLEN - LINEAR
OD_CC: 20/40

## 2018-02-28 ASSESSMENT — TONOMETRY
OD_IOP_MMHG: 17
OS_IOP_MMHG: 16
IOP_METHOD: ICARE

## 2018-02-28 NOTE — NURSING NOTE
Chief Complaints and History of Present Illnesses   Patient presents with     Follow Up For     1 week post op strabismus surgery     HPI    Symptoms:              Comments:  Dane Edward is a 57 year old male, 1 week follow up for:    -Surgery:  1. Forced ductions testing in both eyes   2. Left inferior rectus resection 5.5 mm   3. Right inferior rectus recession 1 mm on adjustable suture- recessed an additional approximate 4 mm during adjustment postoperatively.    Patient states he is feeling well.   Patient states he still sees double vision but much improvement after surgery and nothing like it was before.   Doing alonso as directed.   KARLA Riggins 2/28/2018 1:51 PM

## 2018-02-28 NOTE — LETTER
2018    Gio Barreto MD    RE: Dane GILL Edward  : 1960  MRN: [id number]    Dear Dr. Barreto:    I saw our mutual patient, Dane Edward, in follow-up in my clinic recently after strabismus surgery.  He has thus far had an excellent outcome from a challenging form of strabismus.  I will see him back in 3 months at which time we should know his final outcome and prescribe any prism glasses is required.  Patient will be seeing Dr. Barreto in the next month he tells me.     1. 1 week post-op status post strabismus surgery:     -Surgery:  1. Forced ductions testing in both eyes   2. Left inferior rectus resection 5.5 mm   3. Right inferior rectus recession 1 mm on adjustable suture- recessed an additional approximate 4 mm during adjustment postoperatively    - Alignment: Outstanding- orthophoric in primary gaze at near and distance!  - Diplopia: patient has some blurring and what sounds like monocular diplopia from ointment and ocular surface healing but no binocular diplopia.  - Healing up appropriately  - Tobradex ophthalmic ointment: stop  - Start Predforte 1% twice a day in the operative eye(s) and decrease by one drop daily every week.  Course will complete in 2 weeks.  Start cosopt twice a day in both eyes until follow-up with Dr. Barreto in beginning of April.      Patient's intraocular pressure is up today to 18 / 16 on my check with applanation.  He is concerned.  It has only been one week since strabismus surgery and typically I would expect it to take longer for a steroid response to occur (from adding Tobradex three times a day).  Nevertheless, we will cut short the typical post strabismus surgery steroid taper.    Of note the patient was not entirely clear about which drops he was on prior to my strabismus surgery.  From what I gather he was on Ketorolac in the left eye and Lotepred daily in both eyes before surgery.  It sounds like he was supposed to be on cosopt in both eyes but he  stopped this on his own because he says he didn't need it.  Will ask him to restart.      Advised patient that after his 2 weeks of tapering prednisolone acetate 1%, that Dr. Barreto would manage all of his drops and that no additional post strabismus surgery drops should be required.  We intentionally avoided any conjunctiva manipulation in the region of his prior glaucoma surgeries so there should have been minimal if any effect on the function of those tubes from strabismus surgery.    Return to clinic in 3 months or sooner as needed.      For further exam details, please feel free to contact our office for additional records.  If you wish to contact me regarding this patient please email me at [email or web address] or give my clinic a call to arrange a phone conversation.    Sincerely,    Shelton Baez MD  , Neuro-Ophthalmology and Adult Strabismus  Department of Ophthalmology and Visual Neurosciences  [facility]    Cc:  DO Dm Wood MD

## 2018-02-28 NOTE — MR AVS SNAPSHOT
After Visit Summary   2/28/2018    Dane Edward    MRN: [id number]           Patient Information     Date Of Birth          1960        Visit Information        Provider Department      2/28/2018 2:00 PM Shelton Baez MD [facility]         Follow-ups after your visit        Your next 10 appointments already scheduled     May 25, 2018  9:30 AM CDT   RETURN NEURO with Shelton Baez MD   Eye Clinic ([facility])    [address]  [facility] [address]   [phone]              Who to contact     Please call your clinic at [phone] to:    Ask questions about your health    Make or cancel appointments    Discuss your medicines    Learn about your test results    Speak to your doctor            Additional Information About Your Visit        MyChart Information     [employer] gives you secure access to your electronic health record. If you see a primary care provider, you can also send messages to your care team and make appointments. If you have questions, please call your primary care clinic.  If you do not have a primary care provider, please call [phone] and they will assist you.      [employer] is an electronic gateway that provides easy, online access to your medical records. With [employer], you can request a clinic appointment, read your test results, renew a prescription or communicate with your care team.     To access your existing account, please contact your [facility] or call [phone] for assistance.        Care EveryWhere ID     This is your Care EveryWhere ID. This could be used by other organizations to access your [facility] medical records  [id number]         Blood Pressure from Last 3 Encounters:   02/19/18 (!) 136/93    Weight from Last 3 Encounters:   02/19/18 68 kg (150 lb)              Today, you had the following     No orders found for display        Primary Care Provider Office Phone # Fax #    Kj Gonzalez [phone]       [facility] [address]  [facility] [address]        Equal Access to Services     MARCIN WAGNER : Tyson mckenna ku bobo Soomaali, waaxda luqadaha, qaybta kaalmada adeegyada, josse cruz laYonydelisa gonzalez. So [facility] [phone].    ATENCIÓN: Si habla español, tiene a jaramillo disposición servicios gratuitos de asistencia lingüística. Llame al [phone].    We comply with applicable federal civil rights laws and [place] laws. We do not discriminate on the basis of race, color, national origin, age, disability, sex, sexual orientation, or gender identity.            Thank you!     Thank you for choosing EYE CLINIC  for your care. Our goal is always to provide you with excellent care. Hearing back from our patients is one way we can continue to improve our services. Please take a few minutes to complete the written survey that you may receive in the mail after your visit with us. Thank you!             Your Updated Medication List - Protect others around you: Learn how to safely use, store and throw away your medicines at www.disposemymeds.org.          This list is accurate as of 2/28/18  2:22 PM.  Always use your most recent med list.                   Brand Name Dispense Instructions for use Diagnosis    acetaminophen 325 MG tablet    TYLENOL     Take 650 mg by mouth        amLODIPine 5 MG tablet    NORVASC          calcitRIOL 0.25 MCG capsule    ROCALTROL     Take 0.25 mcg by mouth        Dorzolamide HCl-Timolol Mal PF 22.3-6.8 MG/ML Soln           ferrous sulfate 325 (65 FE) MG Tbec EC tablet      Take 325 mg by mouth        ketorolac 0.5 % ophthalmic solution    ACULAR          Loteprednol Etabonate 0.5 % Gel      Apply 1 drop to eye        Multi-vitamin Tabs tablet      Take 1 tablet by mouth daily        prednisoLONE acetate 1 % ophthalmic susp    PRED FORTE    1 Bottle    Place 1  drop into both eyes 4 times daily    Postoperative eye state       sodium bicarbonate 650 MG tablet      Take 1,300 mg by mouth        VITAMIN D-1000 MAX ST 1000 UNITS Tabs   Generic drug:  cholecalciferol      Take 1,000 Units by mouth

## 2018-02-28 NOTE — PROGRESS NOTES
1. 1 week post-op status post strabismus surgery:     -Surgery:  1. Forced ductions testing in both eyes   2. Left inferior rectus resection 5.5 mm   3. Right inferior rectus recession 1 mm on adjustable suture- recessed an additional approximate 4 mm during adjustment postoperatively    - Alignment: Outstanding- orthophoric in primary gaze at near and distance!  - Diplopia: patient has some blurring and what sounds like monocular diplopia from ointment and ocular surface healing but no binocular diplopia.  - Healing up appropriately  - Tobradex ophthalmic ointment: stop  - Start Predforte 1% twice a day in the operative eye(s) and decrease by one drop daily every week.  Course will complete in 2 weeks.  Start cosopt twice a day in both eyes until follow-up with Dr. Barreto in beginning of April.      Patient's intraocular pressure is up today to 18 / 16 on my check with applanation.  He is concerned.  It has only been one week since strabismus surgery and typically I would expect it to take longer for a steroid response to occur (from adding Tobradex three times a day).  Nevertheless, we will cut short the typical post strabismus surgery steroid taper.    Of note the patient was not entirely clear about which drops he was on prior to my strabismus surgery.  From what I gather he was on Ketorolac in the left eye and Lotepred daily in both eyes before surgery.  It sounds like he was supposed to be on cosopt in both eyes but he stopped this on his own because he says he didn't need it.  Will ask him to restart.      Advised patient that after his 2 weeks of tapering prednisolone acetate 1%, that Dr. Barreto would manage all of his drops and that no additional post strabismus surgery drops should be required.  We intentionally avoided any conjunctiva manipulation in the region of his prior glaucoma surgeries so there should have been minimal if any effect on the function of those tubes from strabismus  surgery.    Return to clinic in 3 months or sooner as needed.       Complete documentation of historical and exam elements from today's encounter can be found in the full encounter summary report (not reduplicated in this progress note).  I personally obtained the chief complaint(s) and history of present illness.  I confirmed and edited as necessary the review of systems, past medical/surgical history, family history, social history, and examination findings as documented by others; and I examined the patient myself.  I personally reviewed the relevant tests, images, and reports as documented above.  I formulated and edited as necessary the assessment and plan and discussed the findings and management plan with the patient and family   Shelton Baez MD

## 2018-04-12 NOTE — LETTER
2017         RE:  :  MRN: Dane Edward  1960  [id number]     Dear Dr. Siegel,    Thank you for asking me to see your very pleasant patient, Dane Edward, in neuro-ophthalmic consultation.  I would like to thank you for sending your records and I have summarized them in the history of present illness. He presented with his spouse who provided additional history.  My assessment and plan are below.  For further details, please see my attached clinic note.      Assessment & Plan     Dane Edward is a 57 year old male with the following diagnoses:   1. Hypertropia of left eye    2. Subjective visual disturbance       He had intraocular pressure around 50 both eyes secondary to topical corticosteroids from iritis.  He underwent glaucoma surgery on 17 on the RIGHT eye and this was fine. He had an ahmed 2 weeks later on the LEFT eye.  He had a numbing shot for both of them.  Postop day 1 for the LEFT eye, his eye was quite red and the eyelids were swollen.  The eye was not proptotic.  No CT was done.  He did not have a canthotomy or cantholysis.  Ever since POD1 his LEFT eye has been very hypertropic.  He was placed in prism glasses about a month ago.  The current prism helps his double vision.      He has a fairly comitant left hypertropia and is doing extremely well in prisms.  It is not clear whether this is myotoxicity from the retrobulbar or due to the Ahmed itself.  Recommend observation x 2 months and then see Shelton Baez MD.  If stable at that time then consider strabismus surgery.      Again, thank you for allowing me to participate in the care of your patient.      Sincerely,    Dm Catherine MD  Professor, Neuro-Ophthalmology  Department of Ophthalmology and Visual Neurosciences  [facility]  CC: PEGGY SIEGEL  [facility]  [address] [facility] [address]  VIA Facsimile: [phone]     Kj Bustos DO  [facility]    [facility] [address]  [facility] [address]  VIA Facsimile: [phone]       DX = hypertropia following glaucoma surgery with retrobulbar   return to ED if symptoms worsen, persist or questions arise/lab results/radiology results/need for outpatient follow-up

## 2018-05-10 ENCOUNTER — TRANSFERRED RECORDS (OUTPATIENT)
Dept: HEALTH INFORMATION MANAGEMENT | Facility: CLINIC | Age: 58
End: 2018-05-10

## 2018-05-24 DIAGNOSIS — H53.2 DOUBLE VISION: Primary | ICD-10-CM

## 2018-05-25 ENCOUNTER — OFFICE VISIT (OUTPATIENT)
Dept: OPHTHALMOLOGY | Facility: CLINIC | Age: 58
End: 2018-05-25
Attending: OPHTHALMOLOGY
Payer: COMMERCIAL

## 2018-05-25 DIAGNOSIS — H50.22 HYPERTROPIA OF LEFT EYE: Primary | ICD-10-CM

## 2018-05-25 DIAGNOSIS — H53.2 DOUBLE VISION: ICD-10-CM

## 2018-05-25 PROCEDURE — 92060 SENSORIMOTOR EXAMINATION: CPT | Mod: ZF | Performed by: OPHTHALMOLOGY

## 2018-05-25 PROCEDURE — G0463 HOSPITAL OUTPT CLINIC VISIT: HCPCS | Mod: 25,ZF | Performed by: TECHNICIAN/TECHNOLOGIST

## 2018-05-25 ASSESSMENT — VISUAL ACUITY
OD_CC: 20/20
CORRECTION_TYPE: GLASSES
OD_CC+: -3
OS_CC+: -2
METHOD: SNELLEN - LINEAR
OS_CC: 20/30

## 2018-05-25 ASSESSMENT — TONOMETRY
IOP_METHOD: ICARE
OS_IOP_MMHG: 14
OD_IOP_MMHG: 16

## 2018-05-25 NOTE — MR AVS SNAPSHOT
After Visit Summary   5/25/2018    Dane Edward    MRN: [id number]           Patient Information     Date Of Birth          1960        Visit Information        Provider Department      5/25/2018 9:30 AM Shelton Baez MD [facility]        Today's Diagnoses     Hypertropia of left eye    -  1    Double vision           Follow-ups after your visit        Who to contact     Please call your clinic at [phone] to:    Ask questions about your health    Make or cancel appointments    Discuss your medicines    Learn about your test results    Speak to your doctor            Additional Information About Your Visit        MyChart Information     [employer] gives you secure access to your electronic health record. If you see a primary care provider, you can also send messages to your care team and make appointments. If you have questions, please call your primary care clinic.  If you do not have a primary care provider, please call [phone] and they will assist you.      [employer] is an electronic gateway that provides easy, online access to your medical records. With [employer], you can request a clinic appointment, read your test results, renew a prescription or communicate with your care team.     To access your existing account, please contact your [facility] or call [phone] for assistance.        Care EveryWhere ID     This is your Care EveryWhere ID. This could be used by other organizations to access your [place] medical records  [id number]         Blood Pressure from Last 3 Encounters:   02/19/18 (!) 136/93    Weight from Last 3 Encounters:   02/19/18 68 kg (150 lb)              We Performed the Following     IOP Measurement     Sensorimotor        Primary Care Provider Office Phone # Fax #    Kj Gonzalez [phone]       [facility] [address] [facility] [address]  [facility] [address]        Equal Access to  Services     [facility]: Hadii bala adams lawson Presleyali, [place] luqadaha, qaybta kaalmada maamemisalencho, josse colin . So [facility] [place] [phone].    ATENCIÓN: Si kalpesh lomeli, tiene a jaramillo disposición servicios gratuitos de asistencia lingüística. Llame al [phone].    We comply with applicable federal civil rights laws and [place] laws. We do not discriminate on the basis of race, color, national origin, age, disability, sex, sexual orientation, or gender identity.            Thank you!     Thank you for choosing EYE CLINIC  for your care. Our goal is always to provide you with excellent care. Hearing back from our patients is one way we can continue to improve our services. Please take a few minutes to complete the written survey that you may receive in the mail after your visit with us. Thank you!             Your Updated Medication List - Protect others around you: Learn how to safely use, store and throw away your medicines at www.disposemymeds.org.          This list is accurate as of 5/25/18 11:59 PM.  Always use your most recent med list.                   Brand Name Dispense Instructions for use Diagnosis    acetaminophen 325 MG tablet    TYLENOL     Take 650 mg by mouth        amLODIPine 5 MG tablet    NORVASC          calcitRIOL 0.25 MCG capsule    ROCALTROL     Take 0.25 mcg by mouth        dorzolamide-timolol PF 22.3-6.8 MG/ML opthalmic solution    COSOPT          ferrous sulfate 325 (65 Fe) MG Tbec EC tablet      Take 325 mg by mouth        ketorolac 0.5 % ophthalmic solution    ACULAR          Loteprednol Etabonate 0.5 % Gel      Apply 1 drop to eye        Multi-vitamin Tabs tablet      Take 1 tablet by mouth daily        prednisoLONE acetate 1 % ophthalmic susp    PRED FORTE    1 Bottle    Place 1 drop into both eyes 4 times daily    Postoperative eye state       sodium bicarbonate 650 MG tablet      Take 1,300 mg by mouth        VITAMIN D-1000 MAX ST 1000 units Tabs    Generic drug:  cholecalciferol      Take 1,000 Units by mouth

## 2018-05-25 NOTE — LETTER
2018    RE: Dane Edward  : 1960  MRN: [id number]    Dear Providers,    I saw our mutual patient, Dane Edward, in follow-up in my clinic recently following strabismus surgery.       1. 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 Status post strabismus surgery 18- there has been some regression of surgical effect since the Postoperative week 1 visit but he still has a very large window of single binocular vision in and around primary gaze without prisms.  Patient has diplopia in right gaze and up gaze.  Would not recommend any additional surgery at this time.  Tube shunt related strabismus surgery is notoriously difficult to treatment surgically and I would consider this to be a very good outcome considering.    -Surgery:  1. Forced ductions testing in both eyes   2. Left inferior rectus resection 5.5 mm   3. Right inferior rectus recession 1 mm on adjustable suture- recessed an   additional approximate 4 mm during adjustment postoperatively    I did not make a follow-up appointment, but I would be happy to see the patient back in the future should any new neuro-ophthalmic concern arise.  Should his strabismus progress to the point where he does not have single binocular vision in primary gaze then we should re-evaluate the possibility of additional strabismus surgery.    For further exam details, please feel free to contact our office for additional records.  If you wish to contact me regarding this patient please email me at [email or web address] or give my clinic a call to arrange a phone conversation.    Sincerely,  Shelton Baez MD  , Neuro-Ophthalmology and Adult Strabismus  Department of Ophthalmology and Visual Neurosciences  [facility]

## 2018-05-25 NOTE — PROGRESS NOTES
1. 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 Status post strabismus surgery 2/19/18- there has been some regression of surgical effect since the Postoperative week 1 visit but he still has a very large window of single binocular vision in and around primary gaze without prisms.  Patient has diplopia in right gaze and up gaze.  Would not recommend any additional surgery at this time.  Tube shunt related strabismus surgery is notoriously difficult to treatment surgically and I would consider this to be a very good outcome considering.    -Surgery:  1. Forced ductions testing in both eyes   2. Left inferior rectus resection 5.5 mm   3. Right inferior rectus recession 1 mm on adjustable suture- recessed an   additional approximate 4 mm during adjustment postoperatively    I did not make a follow-up appointment, but I would be happy to see the patient back in the future should any new neuro-ophthalmic concern arise.  Should his strabismus progress to the point where he does not have single binocular vision in primary gaze then we should re-evaluate the possibility of additional strabismus surgery.         Complete documentation of historical and exam elements from today's encounter can be found in the full encounter summary report (not reduplicated in this progress note).  I personally obtained the chief complaint(s) and history of present illness.  I confirmed and edited as necessary the review of systems, past medical/surgical history, family history, social history, and examination findings as documented by others; and I examined the patient myself.  I personally reviewed the relevant tests, images, and reports as documented above.  I formulated and edited as necessary the assessment and plan and discussed the findings and management plan with the patient and family     Shelton Baez MD

## 2018-05-25 NOTE — NURSING NOTE
Chief Complaints and History of Present Illnesses   Patient presents with     Follow Up For     3 months s/p strabismus surgery     HPI    Symptoms:              Comments:  3 months follow up status post strabismus surgery:    1. Forced ductions testing in both eyes   2. Left inferior rectus resection 5.5 mm   3. Right inferior rectus recession 1 mm on adjustable suture- recessed an additional approximate 4 mm during adjustment postoperatively    Patient states with a small chin elevation and straight ahead, he can see SBV.   Vertical diplopia mainly in RIGHT gaze and especially upgaze per patient.     KARLA Riggins 5/25/2018 9:39 AM

## 2018-06-16 ASSESSMENT — SLIT LAMP EXAM - LIDS
COMMENTS: NORMAL
COMMENTS: NORMAL

## 2018-06-16 ASSESSMENT — EXTERNAL EXAM - LEFT EYE: OS_EXAM: NORMAL

## 2018-06-16 ASSESSMENT — EXTERNAL EXAM - RIGHT EYE: OD_EXAM: NORMAL

## 2020-03-11 ENCOUNTER — HEALTH MAINTENANCE LETTER (OUTPATIENT)
Age: 60
End: 2020-03-11

## 2020-12-27 ENCOUNTER — HEALTH MAINTENANCE LETTER (OUTPATIENT)
Age: 60
End: 2020-12-27

## 2021-04-25 ENCOUNTER — HEALTH MAINTENANCE LETTER (OUTPATIENT)
Age: 61
End: 2021-04-25

## 2021-10-09 ENCOUNTER — HEALTH MAINTENANCE LETTER (OUTPATIENT)
Age: 61
End: 2021-10-09

## 2022-05-21 ENCOUNTER — HEALTH MAINTENANCE LETTER (OUTPATIENT)
Age: 62
End: 2022-05-21

## 2022-08-08 ENCOUNTER — TRANSFERRED RECORDS (OUTPATIENT)
Dept: HEALTH INFORMATION MANAGEMENT | Facility: CLINIC | Age: 62
End: 2022-08-08

## 2022-08-15 ENCOUNTER — TELEPHONE (OUTPATIENT)
Dept: OPHTHALMOLOGY | Facility: CLINIC | Age: 62
End: 2022-08-15

## 2022-08-15 NOTE — TELEPHONE ENCOUNTER
Called and LVM for Dane         for next available return adult strabismus with Nestor - can be peds or pwb.       Hailey Martinez Communication Facilitator on 8/15/2022 at 9:39 AM

## 2022-08-15 NOTE — TELEPHONE ENCOUNTER
----- Message from Micheline Emerson sent at 8/12/2022  3:45 PM CDT -----  Regarding: f/u with Nestor Childers,   Can you schedule this patient for next available return adult strabismus with Nestor - can be peds or pwb.     Thanks,   Micheline

## 2022-09-17 ENCOUNTER — HEALTH MAINTENANCE LETTER (OUTPATIENT)
Age: 62
End: 2022-09-17

## 2022-11-09 ENCOUNTER — TELEPHONE (OUTPATIENT)
Dept: OPHTHALMOLOGY | Facility: CLINIC | Age: 62
End: 2022-11-09

## 2022-11-09 ENCOUNTER — OFFICE VISIT (OUTPATIENT)
Dept: OPHTHALMOLOGY | Facility: CLINIC | Age: 62
End: 2022-11-09
Attending: OPHTHALMOLOGY
Payer: MEDICARE

## 2022-11-09 DIAGNOSIS — H50.22 HYPERTROPIA OF LEFT EYE: ICD-10-CM

## 2022-11-09 DIAGNOSIS — H53.10 SUBJECTIVE VISUAL DISTURBANCE: ICD-10-CM

## 2022-11-09 DIAGNOSIS — H50.21 HYPOTROPIA OF RIGHT EYE: Primary | ICD-10-CM

## 2022-11-09 DIAGNOSIS — H53.2 DOUBLE VISION: ICD-10-CM

## 2022-11-09 PROCEDURE — 92060 SENSORIMOTOR EXAMINATION: CPT | Mod: 26 | Performed by: OPHTHALMOLOGY

## 2022-11-09 PROCEDURE — 99204 OFFICE O/P NEW MOD 45 MIN: CPT | Mod: GC | Performed by: OPHTHALMOLOGY

## 2022-11-09 PROCEDURE — 92060 SENSORIMOTOR EXAMINATION: CPT | Performed by: OPHTHALMOLOGY

## 2022-11-09 PROCEDURE — G0463 HOSPITAL OUTPT CLINIC VISIT: HCPCS | Mod: 25

## 2022-11-09 RX ORDER — CINACALCET 30 MG/1
TABLET, FILM COATED ORAL
COMMUNITY
Start: 2022-07-27

## 2022-11-09 RX ORDER — TACROLIMUS 1 MG/1
2 CAPSULE ORAL EVERY 12 HOURS
COMMUNITY
Start: 2022-09-15

## 2022-11-09 RX ORDER — ROPINIROLE 2 MG/1
2 TABLET, FILM COATED ORAL
COMMUNITY
Start: 2022-07-14

## 2022-11-09 RX ORDER — ACYCLOVIR 200 MG/1
CAPSULE ORAL
COMMUNITY
Start: 2022-06-17

## 2022-11-09 RX ORDER — DIPHENHYDRAMINE HCL 25 MG
25 CAPSULE ORAL
COMMUNITY

## 2022-11-09 RX ORDER — FERROUS SULFATE 325(65) MG
325 TABLET ORAL
COMMUNITY
Start: 2022-05-23

## 2022-11-09 RX ORDER — FLUTICASONE PROPIONATE 50 MCG
2 SPRAY, SUSPENSION (ML) NASAL
COMMUNITY
Start: 2022-04-21

## 2022-11-09 RX ORDER — CARVEDILOL 25 MG/1
25 TABLET ORAL
COMMUNITY
Start: 2022-09-23

## 2022-11-09 RX ORDER — AMLODIPINE BESYLATE 10 MG/1
TABLET ORAL
COMMUNITY
Start: 2022-08-23

## 2022-11-09 RX ORDER — PREDNISONE 5 MG/1
5 TABLET ORAL
COMMUNITY
Start: 2022-06-01 | End: 2023-01-03

## 2022-11-09 RX ORDER — TERAZOSIN 1 MG/1
1 CAPSULE ORAL
COMMUNITY
Start: 2022-08-24

## 2022-11-09 RX ORDER — SULFAMETHOXAZOLE AND TRIMETHOPRIM 400; 80 MG/1; MG/1
1 TABLET ORAL
COMMUNITY
Start: 2022-04-29

## 2022-11-09 RX ORDER — FAMOTIDINE 20 MG/1
20 TABLET, FILM COATED ORAL
COMMUNITY
Start: 2022-06-20

## 2022-11-09 RX ORDER — MYCOPHENOLATE MOFETIL 250 MG/1
750 CAPSULE ORAL
COMMUNITY
Start: 2022-09-22

## 2022-11-09 RX ORDER — AMLODIPINE BESYLATE 5 MG/1
5 TABLET ORAL
COMMUNITY
Start: 2022-10-05

## 2022-11-09 RX ORDER — BISACODYL 5 MG/1
TABLET, DELAYED RELEASE ORAL
COMMUNITY
Start: 2022-11-09

## 2022-11-09 RX ORDER — ASCORBIC ACID, THIAMINE, RIBOFLAVIN, NIACINAMIDE, PYRIDOXINE, FOLIC ACID, COBALAMIN, BIOTIN, PANTOTHENIC ACID 100; 1.5; 1.7; 20; 10; 1; 6; 300; 1 MG/1; MG/1; MG/1; MG/1; MG/1; MG/1; UG/1; UG/1; MG/1
1 TABLET, COATED ORAL EVERY EVENING
COMMUNITY
Start: 2021-08-10

## 2022-11-09 ASSESSMENT — REFRACTION_WEARINGRX
OD_AXIS: 138
OS_AXIS: 042
OS_SPHERE: -3.50
OS_CYLINDER: +0.50
OD_CYLINDER: +0.75
SPECS_TYPE: SINGLE VISION
OD_SPHERE: -3.75

## 2022-11-09 ASSESSMENT — CUP TO DISC RATIO: OS_RATIO: 0.4

## 2022-11-09 ASSESSMENT — CONF VISUAL FIELD
OS_NORMAL: 1
OD_NORMAL: 1
OD_SUPERIOR_TEMPORAL_RESTRICTION: 0
OD_INFERIOR_TEMPORAL_RESTRICTION: 0
OS_SUPERIOR_NASAL_RESTRICTION: 0
METHOD: COUNTING FINGERS
OD_SUPERIOR_NASAL_RESTRICTION: 0
OS_INFERIOR_TEMPORAL_RESTRICTION: 0
OD_INFERIOR_NASAL_RESTRICTION: 0
OS_SUPERIOR_TEMPORAL_RESTRICTION: 0
OS_INFERIOR_NASAL_RESTRICTION: 0

## 2022-11-09 ASSESSMENT — VISUAL ACUITY
OS_CC+: +2
OS_CC: 20/30
METHOD: SNELLEN - LINEAR
OD_CC: 20/20
CORRECTION_TYPE: GLASSES

## 2022-11-09 ASSESSMENT — TONOMETRY
OD_IOP_MMHG: 17
IOP_METHOD: ICARE
OS_IOP_MMHG: 24

## 2022-11-09 ASSESSMENT — EXTERNAL EXAM - LEFT EYE: OS_EXAM: NORMAL

## 2022-11-09 ASSESSMENT — SLIT LAMP EXAM - LIDS
COMMENTS: PTOSIS
COMMENTS: NORMAL

## 2022-11-09 ASSESSMENT — EXTERNAL EXAM - RIGHT EYE: OD_EXAM: NORMAL

## 2022-11-09 NOTE — TELEPHONE ENCOUNTER
11/10/2022 9:49AM Offered to schedule Dane's eye muscle surgery. He states he has not yet received clearance from his kidney doctor and will call me back to schedule once received. My name and direct dial number ([phone]) provided.    11/9/2022 5:28PM Dane states he is driving and unable to schedule now. He requests a call back 11/10 to schedule. Advised that 11/21 or 11/28 are only available for surgery dates in 2022. Otherwise, would be looking at January availability.

## 2022-11-09 NOTE — PROGRESS NOTES
1. 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 Status post strabismus surgery 2/19/18 with some surgical regression.    Patient is now over 2.5 years out from strabismus surgery and has been experiencing daily intermittent diplopia, interested in repeat strabismus surgery.  Today he had a pattern of very prominent left inferior oblique overaction and exhibits intermittently some large vertical fusional amplitudes.  All this is a challenging situation considering his very large Ahmed valve associated blebs in both eyes that we will need to avoid surgically I do believe that a left inferior oblique weakening procedure could significantly improve his misalignment in primary gaze and upgaze and improve the patient's fusion.  We discussed some legitimate risks in his case including failure of his glaucoma surgery in the left eye or even potentially a bleb leak.  We also discussed that he could develop double vision in downgaze where he currently has orthophoric alignment.  The patient is very unhappy with his current double vision particularly some mornings when he has great difficulty seeing single in any gaze position and he is willing to accept these strabismus surgery risks.  We will plan to proceed with surgery    Letter to patient's glaucoma specialist Dr. Barreto.    -Surgery:  1. Forced ductions testing in both eyes   2. Left inferior rectus resection 5.5 mm   3. 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 He is status post strabismus surgery 2/19/18 with Dr. Coulter. Last seen in neuro-ophthalmology clinic 5/25/2018, at which time there had been some regression of surgical effect since the postoperative week 1 visit  but he still had a very large window of single binocular vision in and around primary gaze without prisms.    Today, he presents for follow-up. Since 2018 he had to tilt his head upwards but had no diplopia with this. Over the past year he has noticed intermittent diplopia. This is most noticeable immediately after waking up in the morning or when he is more fatigued toward the end of the day. His diplopia is mostly vertical but has a small horizontal and torsional component as well. During these exacerbations he is not able to overcome the diplopia with a head tilt. He reports no other changes in vision since last visit and sees a primary eye doctor every 6 months.     Of note he had a kidney transplant in April 2022, doing well overall. He takes baby ASA daily.     Corrected distance visual acuity was 20/20 in the right eye and 20/30 +2 in the left eye. Intraocular pressure was 17 in the right eye and 24 in the left eye using ICare.   Pupils show no rAPD.  Anterior segment exam shows PCIOL in both eyes.  Fundus exam shows significant syneresis and possible trace ERM in both eyes. Strabismus exam with right hypotropia / left hypertropia of 18-25 diopters in primary gaze.  There is very prominent left inferior oblique overaction.    We discussed in detail the risks, benefits, and alternatives of eye muscle correction surgery including the very rare risk of death or serious morbidity from a general anesthesia complication and the rare risk of severe vision loss in the operative eye(s) secondary to retinal detachment or endophthalmitis.  We discussed more likely sub-optimal outcomes including the unanticipated need for additional strabismus surgery.  Finally the patient was aware that prisms glasses may be required to optimize single vision following surgery.    Discussed risks specific to his case including the risk of glaucoma surgery failure, over filtration with bleb leak, diplopia in down gaze in detail.  These  are legitimate risks in his case and might lead to the need for additional glaucoma surgery, strabismus surgery, or even double vision that is not amenable to correction with surgery.    After a thorough discussion of these risks, the patient decided to proceed with strabismus surgery.  The surgical plan is as follows:     1. Left inferior oblique myectomy     Follow-up 1 week after strabismus surgery.    [facility]: [facility]    Past medical history- status post renal transplant. 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 Aspirin 81 mg daily. He is unable to stop.  Also rheumatoid arthritis      Patient will get medical clearance from his renal team AND his primary care physician         Cody Dooley MD  Ophthalmology, PGY-3    35 minutes were spent on the date of the encounter by me doing chart review, history and exam, documentation, and further activities as noted above    Complete documentation of historical and exam elements from today's encounter can be found in the full encounter summary report (not reduplicated in this progress note).  I personally obtained the chief complaint(s) and history of present illness.  I confirmed and edited as necessary the review of systems, past medical/surgical history, family history, social history, and examination findings as documented by others; and I examined the patient myself.  I personally reviewed the relevant tests, images, and reports as documented above.  I formulated and edited as necessary the assessment and plan and discussed the findings and management plan with the patient and family.  I personally reviewed the ophthalmic test(s) associated with this encounter, agree with the interpretation(s) as documented by the resident/fellow, and have edited the corresponding report(s) as necessary.     Shelton Baez MD

## 2022-11-09 NOTE — LETTER
November 10, 2022    RE: Dane Edward  : 1960  MRN: [id number]    Dear Providers,    I saw our mutual patient, Dane Edward, in follow-up in my clinic recently.  After a thorough neuro-ophthalmic history and examination, I came to the following conclusions:     1. 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 Status post strabismus surgery 18 with some surgical regression.    Patient is now over 2.5 years out from strabismus surgery and has been experiencing daily intermittent diplopia, interested in repeat strabismus surgery.  Today he had a pattern of very prominent left inferior oblique overaction and exhibits intermittently some large vertical fusional amplitudes.  All this is a challenging situation considering his very large Ahmed valve associated blebs in both eyes that we will need to avoid surgically I do believe that a left inferior oblique weakening procedure could significantly improve his misalignment in primary gaze and upgaze and improve the patient's fusion.  We discussed some legitimate risks in his case including failure of his glaucoma surgery in the left eye or even potentially a bleb leak.  We also discussed that he could develop double vision in downgaze where he currently has orthophoric alignment.  The patient is very unhappy with his current double vision particularly some mornings when he has great difficulty seeing single in any gaze position and he is willing to accept these strabismus surgery risks.  We will plan to proceed with surgery    Letter to patient's glaucoma specialist Dr. Barreto.    -Surgery:  1. Forced ductions testing in both eyes   2. Left inferior rectus resection 5.5 mm   3. Right inferior rectus recession 1 mm on adjustable suture- recessed an   additional approximate 4 mm during adjustment postoperatively    Patient has history of left Ahmed valve associated strabismus with large  angle left hypertropia following July 10, 2017 Ahmed surgery. He is status post strabismus surgery 2/19/18 with Dr. Coulter. Last seen in neuro-ophthalmology clinic 5/25/2018, at which time there had been some regression of surgical effect since the postoperative week 1 visit but he still had a very large window of single binocular vision in and around primary gaze without prisms.    Today, he presents for follow-up. Since 2018 he had to tilt his head upwards but had no diplopia with this. Over the past year he has noticed intermittent diplopia. This is most noticeable immediately after waking up in the morning or when he is more fatigued toward the end of the day. His diplopia is mostly vertical but has a small horizontal and torsional component as well. During these exacerbations he is not able to overcome the diplopia with a head tilt. He reports no other changes in vision since last visit and sees a primary eye doctor every 6 months.     Of note he had a kidney transplant in April 2022, doing well overall. He takes baby ASA daily.     Corrected distance visual acuity was 20/20 in the right eye and 20/30 +2 in the left eye. Intraocular pressure was 17 in the right eye and 24 in the left eye using ICare.   Pupils show no rAPD.  Anterior segment exam shows PCIOL in both eyes.  Fundus exam shows significant syneresis and possible trace ERM in both eyes. Strabismus exam with right hypotropia / left hypertropia of 18-25 diopters in primary gaze.  There is very prominent left inferior oblique overaction.    We discussed in detail the risks, benefits, and alternatives of eye muscle correction surgery including the very rare risk of death or serious morbidity from a general anesthesia complication and the rare risk of severe vision loss in the operative eye(s) secondary to retinal detachment or endophthalmitis.  We discussed more likely sub-optimal outcomes including the unanticipated need for additional strabismus  surgery.  Finally the patient was aware that prisms glasses may be required to optimize single vision following surgery.    Discussed risks specific to his case including the risk of glaucoma surgery failure, over filtration with bleb leak, diplopia in down gaze in detail.  These are legitimate risks in his case and might lead to the need for additional glaucoma surgery, strabismus surgery, or even double vision that is not amenable to correction with surgery.    After a thorough discussion of these risks, the patient decided to proceed with strabismus surgery.  The surgical plan is as follows:     1. Left inferior oblique myectomy     Follow-up 1 week after strabismus surgery.    [facility] at: [facility]    Past medical history- status post renal transplant. 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 Aspirin 81 mg daily. He is unable to stop.  Also rheumatoid arthritis      Patient will get medical clearance from his renal team AND his primary care physician    For further exam details, please feel free to contact our office for additional records.  If you wish to contact me regarding this patient please email me at [facility] [email or web address] or give my clinic a call to arrange a phone conversation.    Sincerely,    Shelton Baez MD  , Neuro-Ophthalmology and Adult Strabismus Surgery  The Azucena Gates Chair in Neuro-Ophthalmology  Department of Ophthalmology and Visual Neurosciences  [facility]

## 2023-12-16 ENCOUNTER — HEALTH MAINTENANCE LETTER (OUTPATIENT)
Age: 63
End: 2023-12-16

## 2024-12-05 ENCOUNTER — MEDICAL CORRESPONDENCE (OUTPATIENT)
Dept: HEALTH INFORMATION MANAGEMENT | Facility: CLINIC | Age: 64
End: 2024-12-05
Payer: COMMERCIAL

## 2024-12-09 ENCOUNTER — TRANSCRIBE ORDERS (OUTPATIENT)
Dept: OTHER | Age: 64
End: 2024-12-09

## 2024-12-09 ENCOUNTER — MEDICAL CORRESPONDENCE (OUTPATIENT)
Dept: HEALTH INFORMATION MANAGEMENT | Facility: CLINIC | Age: 64
End: 2024-12-09
Payer: COMMERCIAL

## 2024-12-09 DIAGNOSIS — H50.22 VERTICAL STRABISMUS OF LEFT EYE: Primary | ICD-10-CM

## 2025-01-12 ENCOUNTER — HEALTH MAINTENANCE LETTER (OUTPATIENT)
Age: 65
End: 2025-01-12

## 2025-02-27 ENCOUNTER — OFFICE VISIT (OUTPATIENT)
Dept: OPHTHALMOLOGY | Facility: CLINIC | Age: 65
End: 2025-02-27
Attending: OPHTHALMOLOGY
Payer: COMMERCIAL

## 2025-02-27 DIAGNOSIS — H53.2 DOUBLE VISION: Primary | ICD-10-CM

## 2025-02-27 DIAGNOSIS — H50.22 VERTICAL STRABISMUS OF LEFT EYE: ICD-10-CM

## 2025-02-27 DIAGNOSIS — H53.10 SUBJECTIVE VISUAL DISTURBANCE: ICD-10-CM

## 2025-02-27 PROCEDURE — 99214 OFFICE O/P EST MOD 30 MIN: CPT | Mod: GC | Performed by: OPHTHALMOLOGY

## 2025-02-27 PROCEDURE — 92060 SENSORIMOTOR EXAMINATION: CPT

## 2025-02-27 PROCEDURE — G0463 HOSPITAL OUTPT CLINIC VISIT: HCPCS | Performed by: OPHTHALMOLOGY

## 2025-02-27 PROCEDURE — 92060 SENSORIMOTOR EXAMINATION: CPT | Mod: 26 | Performed by: OPHTHALMOLOGY

## 2025-02-27 PROCEDURE — 92060 SENSORIMOTOR EXAMINATION: CPT | Performed by: OPHTHALMOLOGY

## 2025-02-27 RX ORDER — ALENDRONATE SODIUM 70 MG/1
70 TABLET ORAL WEEKLY
COMMUNITY
Start: 2023-08-10

## 2025-02-27 RX ORDER — PREDNISONE 5 MG/1
1 TABLET ORAL EVERY MORNING
COMMUNITY
Start: 2024-09-30

## 2025-02-27 RX ORDER — METFORMIN HYDROCHLORIDE 500 MG/1
500 TABLET, EXTENDED RELEASE ORAL
COMMUNITY
Start: 2024-06-24

## 2025-02-27 RX ORDER — ATORVASTATIN CALCIUM 40 MG/1
TABLET, FILM COATED ORAL
COMMUNITY
Start: 2024-07-16

## 2025-02-27 RX ORDER — SENNOSIDES A AND B 8.6 MG/1
1 TABLET, FILM COATED ORAL
COMMUNITY

## 2025-02-27 ASSESSMENT — CONF VISUAL FIELD
OD_INFERIOR_NASAL_RESTRICTION: 0
OD_SUPERIOR_TEMPORAL_RESTRICTION: 0
OD_SUPERIOR_NASAL_RESTRICTION: 0
OS_INFERIOR_TEMPORAL_RESTRICTION: 0
METHOD: COUNTING FINGERS
OD_INFERIOR_TEMPORAL_RESTRICTION: 0
OS_SUPERIOR_NASAL_RESTRICTION: 0
OS_SUPERIOR_TEMPORAL_RESTRICTION: 0
OS_NORMAL: 1
OS_INFERIOR_NASAL_RESTRICTION: 0
OD_NORMAL: 1

## 2025-02-27 ASSESSMENT — REFRACTION_WEARINGRX
OS_AXIS: 065
SPECS_TYPE: SVL
OS_SPHERE: -4.75
OD_SPHERE: -4.00
OS_CYLINDER: +0.75
OD_CYLINDER: +0.75
OD_AXIS: 150

## 2025-02-27 ASSESSMENT — VISUAL ACUITY
OS_CC: 20/30
CORRECTION_TYPE: GLASSES
OD_CC: 20/20
METHOD: SNELLEN - LINEAR
OD_CC+: -3
OS_CC+: -2

## 2025-02-27 ASSESSMENT — EXTERNAL EXAM - RIGHT EYE: OD_EXAM: NORMAL

## 2025-02-27 ASSESSMENT — CUP TO DISC RATIO: OS_RATIO: 0.2

## 2025-02-27 ASSESSMENT — TONOMETRY
OS_IOP_MMHG: 14
OD_IOP_MMHG: 13
IOP_METHOD: ICARE

## 2025-02-27 ASSESSMENT — SLIT LAMP EXAM - LIDS
COMMENTS: PTOSIS
COMMENTS: NORMAL

## 2025-02-27 ASSESSMENT — EXTERNAL EXAM - LEFT EYE: OS_EXAM: NORMAL

## 2025-02-27 NOTE — LETTER
2025    RE: Dane Edward  : 1960  MRN: [id number]    Dear Providers,    I saw our mutual patient, Dane Edward, in follow-up in my clinic recently.  After a thorough neuro-ophthalmic history and examination, I came to the following conclusions:     1. 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 Status post strabismus surgery 18 with some surgical regression.    Patient is now over 7 years out from strabismus surgery s/p left inferior rectus resection 5.5mm and right inferior rectus recession 1mm on adjustable with 4mm additional recession postoperatively. He has had worsening and bothersome double vision and is interested in repair. Exhibits large vertical fusional amplitude and very prominent left inferior oblique overaction.    Plan:   Left inferior oblique myectomy     Prior strabismus repair 2018  1. Forced ductions testing in both eyes   2. Left inferior rectus resection 5.5 mm   3. 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 He is status post strabismus surgery 18 with Dr. Coulter. Last seen in neuro-ophthalmology clinic 2018, at which time there had been some regression of surgical effect since the postoperative week 1 visit but he still had a very large window of single binocular vision in and around primary gaze without prisms.    Today, he presents for follow-up. Since 2018 he had to tilt his head upwards but had no diplopia with this. Over the past year he has noticed intermittent diplopia. This is most noticeable immediately after waking up in the morning or when he is more fatigued toward the end of the day. His diplopia is mostly  vertical but has a small horizontal and torsional component as well. During these exacerbations he is not able to overcome the diplopia with a head tilt. He reports no other changes in vision since last visit and sees a primary eye doctor every 6 months.     Of note he had a kidney transplant in April 2022, doing well overall. He takes baby ASA daily.     Corrected distance visual acuity was 20/20 in the right eye and 20/30 +2 in the left eye. Intraocular pressure was 17 in the right eye and 24 in the left eye using ICare.   Pupils show no rAPD.  Anterior segment exam shows PCIOL in both eyes.  Fundus exam shows significant syneresis and possible trace ERM in both eyes. Strabismus exam with right hypotropia / left hypertropia of 18-25 diopters in primary gaze.  There is very prominent left inferior oblique overaction.      Interval HPI, exam, and data since last visit:  Patient was last seen on 11/9/22    Patient previously seen for hypotropia of the right eye and diplopia. He was potentially going to undergo surgical repair at that time but decided against it due to recent kidney surgery. Symptoms were tolerable at first but now he noticing worsening and is unable control to double vision. He is interested in repair. He feels its worse in the morning and sometimes takes awhile to improve. Double vision for the most part is vertical.      In terms of his glaucoma, patient states that his glaucoma has been stable enough that he is doing less frequent visits and     Exam today:  Visual acuity 20/20 right eye 20/30 left eye.  Color vision is 11/11 in both eyes.  Pupils: no APD.  Intraocular pressure 13 right eye and 14 left eye.      Tests ordered and interpreted today:  Sensorimotor exam today showed essentially full motility in both eyes.  There is a very prominent +3 left inferior oblique overaction.  The patient has a very large approximate 40 prism diopter left hypertropia in primary gaze that increases in right  gaze to 60 prism diopters and decreases in downgaze as compared to upgaze.  He has tremendously large vertical fusional amplitudes considering most patients are able to fuse only 2-3 prism diopters while he is able to fuse up to 40 intermittently which is a strong indication of a component of congenital strabismus at play in this case.    We discussed in detail the risks, benefits, and alternatives of eye muscle correction surgery including the very rare risk of death or serious morbidity from a general anesthesia complication and the rare risk of severe vision loss in the operative eye(s) secondary to retinal detachment or endophthalmitis.  We discussed more likely sub-optimal outcomes including the unanticipated need for additional strabismus surgery.  Finally the patient was aware that prisms glasses may be required to optimize single vision following surgery.    After a thorough discussion of these risks, the patient decided to proceed with strabismus surgery.  The surgical plan is as follows:    Eye muscle correction, left eye.    Left inferior oblique myectomy     Follow-up 1 week after strabismus surgery.    Plan to operate at:   Prism free glasses for adjustment: Non adjustable    Past medical history- status post renal transplant. Aspirin 81 mg daily. He is unable to stop.  Also rheumatoid arthritis . On cellcept and tacrolimus.     Patient will get medical clearance from his renal team AND his primary care physician    Cosopt twice a day in the left eye.  And prednisolone acetate 1% in both eyes twice a day.         For further exam details, please feel free to contact our office for additional records.  If you wish to contact me regarding this patient please email me at [email or web address] or give my clinic a call to arrange a phone conversation.    Sincerely,    Shelton Baez MD  , Neuro-Ophthalmology and Adult Strabismus Surgery  The Azucena Gates Chair in  Neuro-Ophthalmology  Department of Ophthalmology and Visual Neurosciences  [facility]

## 2025-02-27 NOTE — PROGRESS NOTES
1. 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 Status post strabismus surgery 2/19/18 with some surgical regression.    Patient is now over 7 years out from strabismus surgery s/p left inferior rectus resection 5.5mm and right inferior rectus recession 1mm on adjustable with 4mm additional recession postoperatively. He has had worsening and bothersome double vision and is interested in repair. Exhibits large vertical fusional amplitude and very prominent left inferior oblique overaction.    Plan:   Left inferior oblique myectomy     Prior strabismus repair 2/19/2018  1. Forced ductions testing in both eyes   2. Left inferior rectus resection 5.5 mm   3. 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 He is status post strabismus surgery 2/19/18 with Dr. Coulter. Last seen in neuro-ophthalmology clinic 5/25/2018, at which time there had been some regression of surgical effect since the postoperative week 1 visit but he still had a very large window of single binocular vision in and around primary gaze without prisms.    Today, he presents for follow-up. Since 2018 he had to tilt his head upwards but had no diplopia with this. Over the past year he has noticed intermittent diplopia. This is most noticeable immediately after waking up in the morning or when he is more fatigued toward the end of the day. His diplopia is mostly vertical but has a small horizontal and torsional component as well. During these exacerbations he is not able to overcome the diplopia with a head tilt. He reports no other changes in vision since last visit and sees a primary eye doctor every 6 months.     Of note he had a  kidney transplant in April 2022, doing well overall. He takes baby ASA daily.     Corrected distance visual acuity was 20/20 in the right eye and 20/30 +2 in the left eye. Intraocular pressure was 17 in the right eye and 24 in the left eye using ICare.   Pupils show no rAPD.  Anterior segment exam shows PCIOL in both eyes.  Fundus exam shows significant syneresis and possible trace ERM in both eyes. Strabismus exam with right hypotropia / left hypertropia of 18-25 diopters in primary gaze.  There is very prominent left inferior oblique overaction.      Interval HPI, exam, and data since last visit:  Patient was last seen on 11/9/22    Patient previously seen for hypotropia of the right eye and diplopia. He was potentially going to undergo surgical repair at that time but decided against it due to recent kidney surgery. Symptoms were tolerable at first but now he noticing worsening and is unable control to double vision. He is interested in repair. He feels its worse in the morning and sometimes takes awhile to improve. Double vision for the most part is vertical.      In terms of his glaucoma, patient states that his glaucoma has been stable enough that he is doing less frequent visits and     Exam today:  Visual acuity 20/20 right eye 20/30 left eye.  Color vision is 11/11 in both eyes.  Pupils: no APD.  Intraocular pressure 13 right eye and 14 left eye.      Tests ordered and interpreted today:  Sensorimotor exam today showed essentially full motility in both eyes.  There is a very prominent +3 left inferior oblique overaction.  The patient has a very large approximate 40 prism diopter left hypertropia in primary gaze that increases in right gaze to 60 prism diopters and decreases in downgaze as compared to upgaze.  He has tremendously large vertical fusional amplitudes considering most patients are able to fuse only 2-3 prism diopters while he is able to fuse up to 40 intermittently which is a strong indication  of a component of congenital strabismus at play in this case.    We discussed in detail the risks, benefits, and alternatives of eye muscle correction surgery including the very rare risk of death or serious morbidity from a general anesthesia complication and the rare risk of severe vision loss in the operative eye(s) secondary to retinal detachment or endophthalmitis.  We discussed more likely sub-optimal outcomes including the unanticipated need for additional strabismus surgery.  Finally the patient was aware that prisms glasses may be required to optimize single vision following surgery.    After a thorough discussion of these risks, the patient decided to proceed with strabismus surgery.  The surgical plan is as follows:    Eye muscle correction, left eye.    Left inferior oblique myectomy     Follow-up 1 week after strabismus surgery.    Plan to operate at:   Prism free glasses for adjustment: Non adjustable    Past medical history- status post renal transplant. Aspirin 81 mg daily. He is unable to stop.  Also rheumatoid arthritis . On cellcept and tacrolimus.     Patient will get medical clearance from his renal team AND his primary care physician    Cosopt twice a day in the left eye.  And prednisolone acetate 1% in both eyes twice a day.        Vincenzo Farias MD  PGY-3 Ophthalmology Resident  [facility]    45 minutes were spent on the date of the encounter by me doing chart review, history and exam, documentation, and further activities as noted above    Complete documentation of historical and exam elements from today's encounter can be found in the full encounter summary report (not reduplicated in this progress note).  I personally obtained the chief complaint(s) and history of present illness.  I confirmed and edited as necessary the review of systems, past medical/surgical history, family history, social history, and examination findings as documented by others; and I examined the patient  myself.  I personally reviewed the relevant tests, images, and reports as documented above.  I formulated and edited as necessary the assessment and plan and discussed the findings and management plan with the patient and family.  I personally reviewed the ophthalmic test(s) associated with this encounter, agree with the interpretation(s) as documented by the resident/fellow, and have edited the corresponding report(s) as necessary.     Shelton Baez MD

## 2025-03-04 PROBLEM — H53.2 DOUBLE VISION: Status: ACTIVE | Noted: 2025-02-27

## 2025-04-29 ENCOUNTER — ANESTHESIA EVENT (OUTPATIENT)
Dept: SURGERY | Facility: AMBULATORY SURGERY CENTER | Age: 65
End: 2025-04-29
Payer: COMMERCIAL

## 2025-05-05 ENCOUNTER — ANESTHESIA (OUTPATIENT)
Dept: SURGERY | Facility: AMBULATORY SURGERY CENTER | Age: 65
End: 2025-05-05
Payer: COMMERCIAL

## 2025-05-05 ENCOUNTER — HOSPITAL ENCOUNTER (OUTPATIENT)
Facility: AMBULATORY SURGERY CENTER | Age: 65
Discharge: HOME | End: 2025-05-05
Attending: OPHTHALMOLOGY
Payer: COMMERCIAL

## 2025-05-05 VITALS
HEART RATE: 53 BPM | BODY MASS INDEX: 21.98 KG/M2 | RESPIRATION RATE: 18 BRPM | SYSTOLIC BLOOD PRESSURE: 119 MMHG | TEMPERATURE: 98 F | OXYGEN SATURATION: 97 % | WEIGHT: 145 LBS | HEIGHT: 68 IN | DIASTOLIC BLOOD PRESSURE: 74 MMHG

## 2025-05-05 DIAGNOSIS — Z98.890 POSTOPERATIVE EYE STATE: Primary | ICD-10-CM

## 2025-05-05 PROCEDURE — 67314 REVISE EYE MUSCLE: CPT | Mod: LT

## 2025-05-05 PROCEDURE — 67332 REREVISE EYE MUSCLES ADD-ON: CPT | Mod: LT

## 2025-05-05 RX ORDER — PREDNISOLONE ACETATE 10 MG/ML
SUSPENSION/ DROPS OPHTHALMIC
Qty: 10 ML | Refills: 0 | Status: SHIPPED | OUTPATIENT
Start: 2025-05-05

## 2025-05-05 RX ORDER — OXYCODONE HYDROCHLORIDE 5 MG/1
10 TABLET ORAL
Status: COMPLETED | OUTPATIENT
Start: 2025-05-05 | End: 2025-05-05

## 2025-05-05 RX ORDER — BALANCED SALT SOLUTION 6.4; .75; .48; .3; 3.9; 1.7 MG/ML; MG/ML; MG/ML; MG/ML; MG/ML; MG/ML
SOLUTION OPHTHALMIC PRN
Status: DISCONTINUED | OUTPATIENT
Start: 2025-05-05 | End: 2025-05-05 | Stop reason: HOSPADM

## 2025-05-05 RX ORDER — LABETALOL HYDROCHLORIDE 5 MG/ML
10 INJECTION, SOLUTION INTRAVENOUS
Status: DISCONTINUED | OUTPATIENT
Start: 2025-05-05 | End: 2025-05-05 | Stop reason: HOSPADM

## 2025-05-05 RX ORDER — NALOXONE HYDROCHLORIDE 0.4 MG/ML
0.1 INJECTION, SOLUTION INTRAMUSCULAR; INTRAVENOUS; SUBCUTANEOUS
Status: DISCONTINUED | OUTPATIENT
Start: 2025-05-05 | End: 2025-05-05 | Stop reason: HOSPADM

## 2025-05-05 RX ORDER — PROPOFOL 10 MG/ML
INJECTION, EMULSION INTRAVENOUS PRN
Status: DISCONTINUED | OUTPATIENT
Start: 2025-05-05 | End: 2025-05-05

## 2025-05-05 RX ORDER — HYDROMORPHONE HYDROCHLORIDE 1 MG/ML
0.4 INJECTION, SOLUTION INTRAMUSCULAR; INTRAVENOUS; SUBCUTANEOUS EVERY 5 MIN PRN
Status: DISCONTINUED | OUTPATIENT
Start: 2025-05-05 | End: 2025-05-05 | Stop reason: HOSPADM

## 2025-05-05 RX ORDER — ACETAMINOPHEN 325 MG/1
975 TABLET ORAL ONCE
Status: DISCONTINUED | OUTPATIENT
Start: 2025-05-05 | End: 2025-05-05 | Stop reason: HOSPADM

## 2025-05-05 RX ORDER — LIDOCAINE HYDROCHLORIDE 20 MG/ML
INJECTION, SOLUTION INFILTRATION; PERINEURAL PRN
Status: DISCONTINUED | OUTPATIENT
Start: 2025-05-05 | End: 2025-05-05

## 2025-05-05 RX ORDER — OXYMETAZOLINE HYDROCHLORIDE 0.05 G/100ML
SPRAY NASAL PRN
Status: DISCONTINUED | OUTPATIENT
Start: 2025-05-05 | End: 2025-05-05 | Stop reason: HOSPADM

## 2025-05-05 RX ORDER — LIDOCAINE 40 MG/G
CREAM TOPICAL
Status: DISCONTINUED | OUTPATIENT
Start: 2025-05-05 | End: 2025-05-05 | Stop reason: HOSPADM

## 2025-05-05 RX ORDER — OXYCODONE HYDROCHLORIDE 5 MG/1
5 TABLET ORAL
Status: COMPLETED | OUTPATIENT
Start: 2025-05-05 | End: 2025-05-05

## 2025-05-05 RX ORDER — PROPOFOL 10 MG/ML
INJECTION, EMULSION INTRAVENOUS CONTINUOUS PRN
Status: DISCONTINUED | OUTPATIENT
Start: 2025-05-05 | End: 2025-05-05

## 2025-05-05 RX ORDER — ONDANSETRON 2 MG/ML
INJECTION INTRAMUSCULAR; INTRAVENOUS PRN
Status: DISCONTINUED | OUTPATIENT
Start: 2025-05-05 | End: 2025-05-05

## 2025-05-05 RX ORDER — SODIUM CHLORIDE 9 MG/ML
INJECTION, SOLUTION INTRAVENOUS CONTINUOUS PRN
Status: DISCONTINUED | OUTPATIENT
Start: 2025-05-05 | End: 2025-05-05

## 2025-05-05 RX ORDER — FENTANYL CITRATE 50 UG/ML
25 INJECTION, SOLUTION INTRAMUSCULAR; INTRAVENOUS EVERY 5 MIN PRN
Status: DISCONTINUED | OUTPATIENT
Start: 2025-05-05 | End: 2025-05-05 | Stop reason: HOSPADM

## 2025-05-05 RX ORDER — ACETAMINOPHEN 325 MG/1
975 TABLET ORAL ONCE
Status: COMPLETED | OUTPATIENT
Start: 2025-05-05 | End: 2025-05-05

## 2025-05-05 RX ORDER — SODIUM CHLORIDE, SODIUM LACTATE, POTASSIUM CHLORIDE, CALCIUM CHLORIDE 600; 310; 30; 20 MG/100ML; MG/100ML; MG/100ML; MG/100ML
INJECTION, SOLUTION INTRAVENOUS CONTINUOUS
Status: DISCONTINUED | OUTPATIENT
Start: 2025-05-05 | End: 2025-05-05 | Stop reason: HOSPADM

## 2025-05-05 RX ORDER — DEXAMETHASONE SODIUM PHOSPHATE 10 MG/ML
4 INJECTION, SOLUTION INTRAMUSCULAR; INTRAVENOUS
Status: DISCONTINUED | OUTPATIENT
Start: 2025-05-05 | End: 2025-05-05 | Stop reason: HOSPADM

## 2025-05-05 RX ORDER — FENTANYL CITRATE 50 UG/ML
INJECTION, SOLUTION INTRAMUSCULAR; INTRAVENOUS PRN
Status: DISCONTINUED | OUTPATIENT
Start: 2025-05-05 | End: 2025-05-05

## 2025-05-05 RX ORDER — HYDROMORPHONE HYDROCHLORIDE 1 MG/ML
0.2 INJECTION, SOLUTION INTRAMUSCULAR; INTRAVENOUS; SUBCUTANEOUS EVERY 5 MIN PRN
Status: DISCONTINUED | OUTPATIENT
Start: 2025-05-05 | End: 2025-05-05 | Stop reason: HOSPADM

## 2025-05-05 RX ORDER — ONDANSETRON 2 MG/ML
4 INJECTION INTRAMUSCULAR; INTRAVENOUS EVERY 30 MIN PRN
Status: DISCONTINUED | OUTPATIENT
Start: 2025-05-05 | End: 2025-05-05 | Stop reason: HOSPADM

## 2025-05-05 RX ORDER — DEXAMETHASONE SODIUM PHOSPHATE 10 MG/ML
4 INJECTION, SOLUTION INTRAMUSCULAR; INTRAVENOUS
Status: DISCONTINUED | OUTPATIENT
Start: 2025-05-05 | End: 2025-05-06 | Stop reason: HOSPADM

## 2025-05-05 RX ORDER — FENTANYL CITRATE 50 UG/ML
50 INJECTION, SOLUTION INTRAMUSCULAR; INTRAVENOUS EVERY 5 MIN PRN
Status: DISCONTINUED | OUTPATIENT
Start: 2025-05-05 | End: 2025-05-05 | Stop reason: HOSPADM

## 2025-05-05 RX ORDER — ONDANSETRON 4 MG/1
4 TABLET, ORALLY DISINTEGRATING ORAL EVERY 30 MIN PRN
Status: DISCONTINUED | OUTPATIENT
Start: 2025-05-05 | End: 2025-05-06 | Stop reason: HOSPADM

## 2025-05-05 RX ORDER — NALOXONE HYDROCHLORIDE 0.4 MG/ML
0.1 INJECTION, SOLUTION INTRAMUSCULAR; INTRAVENOUS; SUBCUTANEOUS
Status: DISCONTINUED | OUTPATIENT
Start: 2025-05-05 | End: 2025-05-06 | Stop reason: HOSPADM

## 2025-05-05 RX ORDER — ONDANSETRON 4 MG/1
4 TABLET, ORALLY DISINTEGRATING ORAL EVERY 30 MIN PRN
Status: DISCONTINUED | OUTPATIENT
Start: 2025-05-05 | End: 2025-05-05 | Stop reason: HOSPADM

## 2025-05-05 RX ORDER — ONDANSETRON 2 MG/ML
4 INJECTION INTRAMUSCULAR; INTRAVENOUS EVERY 30 MIN PRN
Status: DISCONTINUED | OUTPATIENT
Start: 2025-05-05 | End: 2025-05-06 | Stop reason: HOSPADM

## 2025-05-05 RX ADMIN — LIDOCAINE HYDROCHLORIDE 80 MG: 20 INJECTION, SOLUTION INFILTRATION; PERINEURAL at 07:18

## 2025-05-05 RX ADMIN — ONDANSETRON 4 MG: 2 INJECTION INTRAMUSCULAR; INTRAVENOUS at 08:42

## 2025-05-05 RX ADMIN — ACETAMINOPHEN 975 MG: 325 TABLET ORAL at 10:07

## 2025-05-05 RX ADMIN — PROPOFOL 150 MG: 10 INJECTION, EMULSION INTRAVENOUS at 07:18

## 2025-05-05 RX ADMIN — FENTANYL CITRATE 50 MCG: 50 INJECTION, SOLUTION INTRAMUSCULAR; INTRAVENOUS at 07:22

## 2025-05-05 RX ADMIN — FENTANYL CITRATE 25 MCG: 50 INJECTION, SOLUTION INTRAMUSCULAR; INTRAVENOUS at 08:15

## 2025-05-05 RX ADMIN — ONDANSETRON 4 MG: 2 INJECTION INTRAMUSCULAR; INTRAVENOUS at 07:29

## 2025-05-05 RX ADMIN — FENTANYL CITRATE 25 MCG: 50 INJECTION, SOLUTION INTRAMUSCULAR; INTRAVENOUS at 08:54

## 2025-05-05 RX ADMIN — OXYCODONE HYDROCHLORIDE 5 MG: 5 TABLET ORAL at 08:22

## 2025-05-05 RX ADMIN — PROPOFOL 200 MCG/KG/MIN: 10 INJECTION, EMULSION INTRAVENOUS at 07:18

## 2025-05-05 RX ADMIN — PROPOFOL 50 MG: 10 INJECTION, EMULSION INTRAVENOUS at 07:38

## 2025-05-05 RX ADMIN — SODIUM CHLORIDE: 9 INJECTION, SOLUTION INTRAVENOUS at 07:09

## 2025-05-05 RX ADMIN — OXYCODONE HYDROCHLORIDE 5 MG: 5 TABLET ORAL at 09:08

## 2025-05-05 RX ADMIN — FENTANYL CITRATE 50 MCG: 50 INJECTION, SOLUTION INTRAMUSCULAR; INTRAVENOUS at 08:10

## 2025-05-05 RX ADMIN — PROPOFOL 50 MG: 10 INJECTION, EMULSION INTRAVENOUS at 07:19

## 2025-05-05 RX ADMIN — FENTANYL CITRATE 50 MCG: 50 INJECTION, SOLUTION INTRAMUSCULAR; INTRAVENOUS at 07:09

## 2025-05-05 RX ADMIN — PROPOFOL 60 MG: 10 INJECTION, EMULSION INTRAVENOUS at 07:34

## 2025-05-05 ASSESSMENT — LIFESTYLE VARIABLES: TOBACCO_USE: 1

## 2025-05-05 ASSESSMENT — COPD QUESTIONNAIRES: COPD: 0

## 2025-05-05 ASSESSMENT — ENCOUNTER SYMPTOMS: ORTHOPNEA: 0

## 2025-05-05 NOTE — ANESTHESIA POSTPROCEDURE EVALUATION
Patient: Dane Edward    Procedure: Procedure(s):  Eye muscle correction, left eye       Anesthesia Type:  General    Note:  Disposition: Outpatient   Postop Pain Control: Uneventful            Sign Out: Well controlled pain   PONV: Yes            Symptoms: Nausea only            Sign Out: PONV/POV resolved with treatment   Neuro/Psych: Uneventful            Sign Out: Acceptable/Baseline neuro status   Airway/Respiratory: Uneventful            Sign Out: Acceptable/Baseline resp. status   CV/Hemodynamics: Uneventful            Sign Out: Acceptable CV status; No obvious hypovolemia; No obvious fluid overload   Other NRE:    DID A NON-ROUTINE EVENT OCCUR? No    Event details/Postop Comments:  Alert, oriented. Postoperative pain and nausea, both improving with prn medications. PACU RN also communicated with surgeon re postoperative pain.            Last vitals:  Vitals Value Taken Time   /77 05/05/25 0825   Temp 36.6  C (97.9  F) 05/05/25 0825   Pulse 45 05/05/25 0825   Resp 16 05/05/25 0825   SpO2 95 % 05/05/25 0825       Electronically Signed By: Debbie Almendarez MD  May 5, 2025  11:14 AM

## 2025-05-05 NOTE — DISCHARGE INSTRUCTIONS
[facility]  Home Care Following Anesthesia  For 24 hours after surgery:  Get plenty of rest.  A responsible adult must stay with you for at least 24 hours after you leave the surgery center.  Do not drive or use heavy equipment.  If you have weakness or tingling, don't drive or use heavy equipment until this feeling goes away.   Do not drink alcohol.   Avoid strenuous or risky activities.  Ask for help when climbing stairs.  You may feel lightheaded.  IF so, sit for a few minutes before standing.  Have someone help you get up.   If you have nausea (feel sick to your stomach): Drink only clear liquids such as apple juice, ginger ale, broth or 7-Up.  Rest may also help.  Be sure to drink enough fluids.  Move to a regular diet as you feel able.   You may have a slight fever.  Call the doctor if your fever is over 100 F (37.7 C) (taken under the tongue) or lasts longer than 24 hours.  You may have a dry mouth, a sore throat, muscle aches or trouble sleeping. These should go away after 24 hours.  Do not make important or legal decisions.   It is recommended to avoid smoking.               Tips for taking pain medications  To get the best pain relief possible, remember these points:  Take pain medications as directed, before pain becomes severe.  Pain medication can upset your stomach: taking it with food may help.  Constipation is a common side effect of pain medication. Drink plenty of  fluids.  Eat foods high in fiber. Take a stool softener if recommended by your doctor or pharmacist.  Do not drink alcohol, drive or operate machinery while taking pain medications.  Ask about other ways to control pain, such as with heat, ice or relaxation.    Tylenol/Acetaminophen Consumption    If you feel your pain relief is insufficient, you may take Tylenol/Acetaminophen in addition to your narcotic pain medication.   Be careful not to exceed 4,000 mg of Tylenol/Acetaminophen in a 24 hour  period from all sources.  If you are taking extra strength Tylenol/acetaminophen (500 mg), the maximum dose is 8 tablets in 24 hours.  If you are taking regular strength acetaminophen (325 mg), the maximum dose is 12 tablets in 24 hours.    Call a doctor for any of the following:  Signs of infection (fever, growing tenderness at the surgery site, a large amount of drainage or bleeding, severe pain, foul-smelling drainage, redness, swelling).  It has been over 8 to 10 hours since surgery and you are still not able to urinate (pass water).  Headache for over 24 hours.  Numbness, tingling or weakness the day after surgery (if you had spinal anesthesia).  Signs of Covid-19 infection (temperature over 100 degrees, shortness of breath, cough, loss of taste/smell, generalized body aches, persistent headache, chills, sore throat, nausea/vomiting/diarrhea)    Your doctor is:       Dr. Shelton Baez, Ophthalmology: [phone]               After hours and weekends call the hospital @ [phone] and ask for the resident on call for:  Ophthalmology  For emergency care, call the:  [place]:  [phone] (TTY for hearing impaired: [phone])

## 2025-05-05 NOTE — OP NOTE
ATTENDING SURGEON:  Shelton Baez MD    DATE OF PROCEDURE:  May 5, 2025    FIRST SURGICAL ASSISTANT:  Ilya Lagunas MD: Neuro-ophthalmology fellow     SECOND SURGICAL ASSISTANT:   Keaton Rene MD - PGY 3 ophthalmology resident    ANESTHESIA:  General anesthesia    PREOPERATIVE DIAGNOSIS (ES):  Left hypertropia   Binocular diplopia   Status post ahmed glaucoma tube shunt placement in both eyes (with diplopia after left eye placement)    POSTOPERATIVE DIAGNOSIS (ES):  Same    NAME OF OPERATION:  Left inferior oblique myectomy     INDICATIONS FOR PROCEDURE:    The patient is a pleasant 64 year old who developed binocular diplopia shortly after his Ahmed glaucoma shunt in the left eye back in 2017.  He had highly bothersome diplopia at that time and after failure of ground in prism glasses and documented stability of strabismus on serial sensorimotor exams he underwent strabismus surgery by me in 2018 that included a left inferior rectus resection and a right inferior rectus recession on adjustable suture (total recession approximately 5 mm).  The patient did well for years with fusion near primary gaze with a small chin up position. Over the past 1 year his diplopia has become more bothersome again and he had a left hypertropia that measured approximately 40 prism diopters in primary gaze with a prominent left inferior oblique overaction. He was eager for strabismus surgery to improve his fusion / reduce binocular diplopia.      I warned the patient about the risks, benefits, and alternatives of eye muscle surgery including a relatively small risk for severe infection, retinal detachment, and permanent vision loss of the eye.  I also discussed the possibility of postoperative double vision.  I discussed the possibility that due to postoperative double vision or a postoperative recurrent strabismus, the patient may require additional strabismus procedures in the future.  Patient also was consented that  "additional strabismus surgery could compromise his glaucoma surgery in the left eye including overfiltration with bleb leak and hypotony or post-operative scarring causing a failure of the bleb / shunt causing recurrent ocular hypertension. Understanding these risks, the patient decided to proceed with strabismus surgery.      DESCRIPTION OF PROCEDURE:    After the risks, benefits, and alternatives of eye muscle surgery were discussed with the patient and the patient's questions were answered both in clinic and on the day of surgery, written informed consent was obtained and witnessed in the preoperative holding area on the day of surgery.  The word \"yes\" was written over the left eye indicating that the patient consented to eye muscle correction in the left eye.  An intravenous line was placed and light intravenous sedation was given.  The patient was transported to the operating room where after appropriate monitors were placed, the patient underwent induction of general anesthesia without complication.      Both eyes were prepped and draped in the usual sterile fashion.  A lid speculum was placed in the left eye.  Forced ductions testing of this eye revealed no restriction to movement.  Tonio testing was performed of his bleb overlying the Ahmed valve and there was no leakage found.  Conjunctival forceps and Anna scissors were used to create a conjunctival incision in the inferotemporal fornix down to the level of bare sclera.  The incision was made more inferiorly than typical in order to avoid the region of the superotemporal ahmed valve. Two conjunctival forceps were used to retract tenon's capsule and directly visualize the inferior oblique muscle which was isolated using two Paul tenotomy hooks.  The inferior oblique muscle belly was pulled up through the incision and approximately 1-1.5 centimeter segment of the muscle was clamped on either end with mosquito hemostats.  This section of muscle was " removed and the two stumps of remaining muscle were cauterized.  At this point additional examination of the inferotemporal fornix revealed no additional muscle fibers of the inferior oblique muscle.  The muscle ends were released from the hemostats and retracted into the periocular tenons space. The conjunctival incision was closed with three 6-0 plain gut sutures placed in an interrupted fashion.  Tonio test was repeated with no leakage of the bleb nor any leakage in the region of the conjunctival incision.  At this point the lid speculum was removed.     The drapes were removed. The face was cleaned.  Maxitrol ointment was placed in the left eye. The patient was awakened from general anesthesia without complications and discharged to the recovery room in stable condition.      SPECIMENS REMOVED:  None.      ESTIMATED BLOOD LOSS:  1 mL or less.    INTRAOPERATIVE FLUIDS:  As per anesthesia records.      SPONGE/INSTRUMENT/NEEDLE COUNTS:  All sponge, instrument, and needle counts were correct times two.    CONDITION ON DISCHARGE FROM OPERATING ROOM:  Stable.      COMPLICATIONS:  None.     I performed the entire procedure as primary surgeon
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
## 2025-05-05 NOTE — BRIEF OP NOTE
[facility] [place]    Brief Operative Note     Pre-operative diagnosis: Strabismus [H50.9]  Post-operative diagnosis Same as pre-operative diagnosis    Procedure(s):  Eye muscle correction, left eye    Surgeons and Role:     * Shelton Baez MD - Primary     * Keaton Rene MD - Resident - Assisting     * Ilya Ballard MD - Fellow - Assisting    Anesthesia: General   Estimated Blood Loss: Less than 1 ml    Drains:   None  Specimens:  * No specimens in log *  Findings:   See full operative report.  Complications:             None.  Implants:  * No implants in log *

## 2025-05-05 NOTE — ANESTHESIA CARE TRANSFER NOTE
Patient: Dane Edward    Procedure: Procedure(s):  Eye muscle correction, left eye       Diagnosis: Double vision [H53.2]  Diagnosis Additional Information: No value filed.    Anesthesia Type:   General     Note:    Oropharynx: oropharynx clear of all foreign objects and spontaneously breathing  Level of Consciousness: drowsy  Oxygen Supplementation: face mask  Level of Supplemental Oxygen (L/min / FiO2): 6  Independent Airway: airway patency satisfactory and stable  Dentition: dentition unchanged  Vital Signs Stable: post-procedure vital signs reviewed and stable  Report to RN Given: handoff report given  Patient transferred to: PACU    Handoff Report: Identifed the Patient, Identified the Reponsible Provider, Reviewed the pertinent medical history, Discussed the surgical course, Reviewed Intra-OP anesthesia mangement and issues during anesthesia, Set expectations for post-procedure period and Allowed opportunity for questions and acknowledgement of understanding    Vital signs per nursing documentation.     Vitals:  Vitals Value Taken Time   BP     Temp     Pulse 48 05/05/25 0802   Resp 13 05/05/25 0802   SpO2 99 % 05/05/25 0802   Vitals shown include unfiled device data.    Electronically Signed By: ADDY Golden CRNA  May 5, 2025  8:02 AM

## 2025-05-05 NOTE — ANESTHESIA PROCEDURE NOTES
Airway       Patient location during procedure: OR  Staff -        Anesthesiologist:  Debbie Almendarez MD       CRNA: Annel Bennett APRN CRNA       Performed By: CRNAIndications and Patient Condition       Indications for airway management: johnson-procedural       Induction type:intravenous       Mask difficulty assessment: 0 - not attempted    Final Airway Details       Final airway type: supraglottic airway    Supraglottic Airway Details        Type: LMA       Brand: I-Gel       LMA size: 5    Post intubation assessment        Placement verified by: capnometry, equal breath sounds and chest rise        Number of attempts at approach: 1       Number of other approaches attempted: 0       Secured with: tape       Ease of procedure: easy       Dentition: Unchanged and Intact

## 2025-05-14 ENCOUNTER — OFFICE VISIT (OUTPATIENT)
Dept: OPHTHALMOLOGY | Facility: CLINIC | Age: 65
End: 2025-05-14
Attending: OPHTHALMOLOGY
Payer: COMMERCIAL

## 2025-05-14 DIAGNOSIS — H53.2 DOUBLE VISION: Primary | ICD-10-CM

## 2025-05-14 PROCEDURE — G0463 HOSPITAL OUTPT CLINIC VISIT: HCPCS | Performed by: OPHTHALMOLOGY

## 2025-05-14 PROCEDURE — 99024 POSTOP FOLLOW-UP VISIT: CPT | Performed by: OPHTHALMOLOGY

## 2025-05-14 ASSESSMENT — VISUAL ACUITY
OD_CC: 20/20
OS_CC+: -1
OS_CC: 20/40
METHOD: SNELLEN - LINEAR
CORRECTION_TYPE: GLASSES

## 2025-05-14 ASSESSMENT — REFRACTION_WEARINGRX
OD_SPHERE: -4.00
OS_AXIS: 065
OD_CYLINDER: +0.75
OS_SPHERE: -4.75
OS_CYLINDER: +0.75
SPECS_TYPE: SVL
OD_AXIS: 150

## 2025-05-14 ASSESSMENT — TONOMETRY
OD_IOP_MMHG: 14
OS_IOP_MMHG: 16
IOP_METHOD: ICARE

## 2025-05-14 NOTE — PROGRESS NOTES
1. 1 week post-op status post strabismus surgery:     -Surgery: 5/5/25    PREOPERATIVE DIAGNOSIS (ES):  Left hypertropia   Binocular diplopia   Status post ahmed glaucoma tube shunt placement in both eyes (with diplopia after left eye placement)     POSTOPERATIVE DIAGNOSIS (ES):  Same     NAME OF OPERATION:  Left inferior oblique myectomy     - Alignment: Excellent alignment today in primary gaze and downgaze.  He does have residual misalignment in upgaze.  - Diplopia: Significantly improved diplopia.  - Healing up appropriately.  Temporarily placed Ahmed bleb was Tonio negative today. Intraocular pressure today good in the left eye 16 without evidence of hypotony or ocular hypertension.  - Maxitrol ophthalmic ointment: stop  - Start Predforte 1% four times a day in the operative eye(s) and decrease by one drop daily every week.  Once he reaches 1 drop twice a day then he will stay on this dosing because this is his long-term dosing for uveitis.    Return to clinic in 3 months or sooner as needed.       Complete documentation of historical and exam elements from today's encounter can be found in the full encounter summary report (not reduplicated in this progress note).  I personally obtained the chief complaint(s) and history of present illness.  I confirmed and edited as necessary the review of systems, past medical/surgical history, family history, social history, and examination findings as documented by others; and I examined the patient myself.  I personally reviewed the relevant tests, images, and reports as documented above.  I formulated and edited as necessary the assessment and plan and discussed the findings and management plan with the patient and family     Shelton Baez MD

## (undated) DEVICE — SU VICRYL 6-0 S-29 12" J556G

## (undated) DEVICE — EYE PREP BETADINE 5% SOLUTION 30ML 0065-0411-30

## (undated) DEVICE — ESU CORD BIPOLAR GREEN 10-4000

## (undated) DEVICE — ESU HOLSTER PLASTIC DISP E2400

## (undated) DEVICE — EYE MARKING PAD 581057

## (undated) DEVICE — EYE FLUORESCEIN OPHTHALMIC STRIP FLO-GLO 1272111

## (undated) DEVICE — SU PLAIN 6-0 G-1DA 18" 770G

## (undated) DEVICE — GLOVE PROTEXIS MICRO 7.5  2D73PM75

## (undated) DEVICE — LINEN TOWEL PACK X5 5464

## (undated) DEVICE — PACK MINOR EYE CUSTOM ASC SEY15MEUMH

## (undated) DEVICE — SU VICRYL 6-0 S-14DA 18" UND J670G

## (undated) DEVICE — SOL WATER IRRIG 500ML BOTTLE 2F7113

## (undated) DEVICE — DRSG GAUZE 4X4" 3033

## (undated) DEVICE — DRAPE STERI TOWEL LG 1010

## (undated) DEVICE — DRSG STERI STRIP 1/2X4" R1547

## (undated) DEVICE — EYE SPONGE SPEAR WECK CEL 0008685

## (undated) DEVICE — PACK MINOR EYE CUSTOM ASC

## (undated) DEVICE — GLOVE BIOGEL PI MICRO SZ 7.5 48575

## (undated) RX ORDER — PROPOFOL 10 MG/ML
INJECTION, EMULSION INTRAVENOUS
Status: DISPENSED
Start: 2025-05-05

## (undated) RX ORDER — ONDANSETRON 2 MG/ML
INJECTION INTRAMUSCULAR; INTRAVENOUS
Status: DISPENSED
Start: 2025-05-05

## (undated) RX ORDER — KETOROLAC TROMETHAMINE 30 MG/ML
INJECTION, SOLUTION INTRAMUSCULAR; INTRAVENOUS
Status: DISPENSED
Start: 2025-05-05

## (undated) RX ORDER — OXYMETAZOLINE HYDROCHLORIDE 0.05 G/100ML
SPRAY NASAL
Status: DISPENSED
Start: 2018-02-19

## (undated) RX ORDER — ACETAMINOPHEN 325 MG/1
TABLET ORAL
Status: DISPENSED
Start: 2018-02-19

## (undated) RX ORDER — DEXAMETHASONE SODIUM PHOSPHATE 4 MG/ML
INJECTION, SOLUTION INTRA-ARTICULAR; INTRALESIONAL; INTRAMUSCULAR; INTRAVENOUS; SOFT TISSUE
Status: DISPENSED
Start: 2018-02-19

## (undated) RX ORDER — OXYCODONE HYDROCHLORIDE 5 MG/1
TABLET ORAL
Status: DISPENSED
Start: 2025-05-05

## (undated) RX ORDER — ACETAMINOPHEN 325 MG/1
TABLET ORAL
Status: DISPENSED
Start: 2025-05-05

## (undated) RX ORDER — GLYCOPYRROLATE 0.2 MG/ML
INJECTION INTRAMUSCULAR; INTRAVENOUS
Status: DISPENSED
Start: 2018-02-19

## (undated) RX ORDER — ONDANSETRON 2 MG/ML
INJECTION INTRAMUSCULAR; INTRAVENOUS
Status: DISPENSED
Start: 2018-02-19

## (undated) RX ORDER — PROPOFOL 10 MG/ML
INJECTION, EMULSION INTRAVENOUS
Status: DISPENSED
Start: 2018-02-19

## (undated) RX ORDER — DEXAMETHASONE SODIUM PHOSPHATE 4 MG/ML
INJECTION, SOLUTION INTRA-ARTICULAR; INTRALESIONAL; INTRAMUSCULAR; INTRAVENOUS; SOFT TISSUE
Status: DISPENSED
Start: 2025-05-05

## (undated) RX ORDER — OXYMETAZOLINE HYDROCHLORIDE 0.05 G/100ML
SPRAY NASAL
Status: DISPENSED
Start: 2025-05-05

## (undated) RX ORDER — BALANCED SALT SOLUTION 6.4; .75; .48; .3; 3.9; 1.7 MG/ML; MG/ML; MG/ML; MG/ML; MG/ML; MG/ML
SOLUTION OPHTHALMIC
Status: DISPENSED
Start: 2025-05-05

## (undated) RX ORDER — LIDOCAINE HYDROCHLORIDE 20 MG/ML
INJECTION, SOLUTION EPIDURAL; INFILTRATION; INTRACAUDAL; PERINEURAL
Status: DISPENSED
Start: 2018-02-19

## (undated) RX ORDER — FENTANYL CITRATE 50 UG/ML
INJECTION, SOLUTION INTRAMUSCULAR; INTRAVENOUS
Status: DISPENSED
Start: 2018-02-19

## (undated) RX ORDER — DEXMEDETOMIDINE HYDROCHLORIDE 4 UG/ML
INJECTION, SOLUTION INTRAVENOUS
Status: DISPENSED
Start: 2025-05-05

## (undated) RX ORDER — FENTANYL CITRATE 50 UG/ML
INJECTION, SOLUTION INTRAMUSCULAR; INTRAVENOUS
Status: DISPENSED
Start: 2025-05-05

## (undated) RX ORDER — TETRACAINE HYDROCHLORIDE 5 MG/ML
SOLUTION OPHTHALMIC
Status: DISPENSED
Start: 2018-02-19